# Patient Record
Sex: FEMALE | Race: WHITE | Employment: OTHER | URBAN - METROPOLITAN AREA
[De-identification: names, ages, dates, MRNs, and addresses within clinical notes are randomized per-mention and may not be internally consistent; named-entity substitution may affect disease eponyms.]

---

## 2017-04-28 ENCOUNTER — OFFICE VISIT (OUTPATIENT)
Dept: FAMILY MEDICINE CLINIC | Age: 52
End: 2017-04-28

## 2017-04-28 VITALS
WEIGHT: 185.1 LBS | BODY MASS INDEX: 34.06 KG/M2 | SYSTOLIC BLOOD PRESSURE: 118 MMHG | RESPIRATION RATE: 16 BRPM | DIASTOLIC BLOOD PRESSURE: 76 MMHG | HEIGHT: 62 IN | OXYGEN SATURATION: 99 % | HEART RATE: 68 BPM

## 2017-04-28 DIAGNOSIS — J40 BRONCHITIS: Primary | ICD-10-CM

## 2017-04-28 DIAGNOSIS — Z12.39 SCREENING FOR BREAST CANCER: ICD-10-CM

## 2017-04-28 DIAGNOSIS — Z13.9 SCREENING: ICD-10-CM

## 2017-04-28 PROCEDURE — 99213 OFFICE O/P EST LOW 20 MIN: CPT | Performed by: NURSE PRACTITIONER

## 2017-04-28 ASSESSMENT — ENCOUNTER SYMPTOMS
SHORTNESS OF BREATH: 0
NAUSEA: 0
COUGH: 0
ABDOMINAL PAIN: 0

## 2018-05-02 ENCOUNTER — TELEPHONE (OUTPATIENT)
Dept: FAMILY MEDICINE CLINIC | Age: 53
End: 2018-05-02

## 2019-03-25 ENCOUNTER — HOSPITAL ENCOUNTER (EMERGENCY)
Age: 54
Discharge: HOME | End: 2019-03-25

## 2019-03-25 VITALS
OXYGEN SATURATION: 99 % | DIASTOLIC BLOOD PRESSURE: 92 MMHG | TEMPERATURE: 98.4 F | HEART RATE: 75 BPM | SYSTOLIC BLOOD PRESSURE: 145 MMHG

## 2019-03-25 DIAGNOSIS — H65.93 MIDDLE EAR EFFUSION, BILATERAL: ICD-10-CM

## 2019-03-25 DIAGNOSIS — J02.9 ACUTE PHARYNGITIS, UNSPECIFIED ETIOLOGY: Primary | ICD-10-CM

## 2019-03-25 LAB
FLU A ANTIGEN: NEGATIVE
FLU B ANTIGEN: NEGATIVE
GROUP A STREP CULTURE, REFLEX: NEGATIVE
REFLEX THROAT C + S: NORMAL

## 2019-03-25 PROCEDURE — 87804 INFLUENZA ASSAY W/OPTIC: CPT

## 2019-03-25 PROCEDURE — 99282 EMERGENCY DEPT VISIT SF MDM: CPT

## 2019-03-25 PROCEDURE — 6360000002 HC RX W HCPCS: Performed by: NURSE PRACTITIONER

## 2019-03-25 PROCEDURE — 96372 THER/PROPH/DIAG INJ SC/IM: CPT

## 2019-03-25 PROCEDURE — 87070 CULTURE OTHR SPECIMN AEROBIC: CPT

## 2019-03-25 PROCEDURE — 87880 STREP A ASSAY W/OPTIC: CPT

## 2019-03-25 RX ORDER — CETIRIZINE HYDROCHLORIDE 10 MG/1
10 TABLET ORAL DAILY
Qty: 30 TABLET | Refills: 0 | Status: SHIPPED | OUTPATIENT
Start: 2019-03-25 | End: 2019-04-24

## 2019-03-25 RX ADMIN — PENICILLIN G BENZATHINE 1.2 MILLION UNITS: 1200000 INJECTION, SUSPENSION INTRAMUSCULAR at 22:34

## 2019-03-25 ASSESSMENT — PAIN SCALES - GENERAL: PAINLEVEL_OUTOF10: 8

## 2019-03-25 ASSESSMENT — ENCOUNTER SYMPTOMS
COUGH: 0
WHEEZING: 0
DIARRHEA: 0
RHINORRHEA: 0
BACK PAIN: 0
SORE THROAT: 1
ABDOMINAL PAIN: 0
NAUSEA: 0
VOMITING: 0
EYE PAIN: 0
SHORTNESS OF BREATH: 0
EYE DISCHARGE: 0

## 2019-03-25 ASSESSMENT — PAIN DESCRIPTION - DESCRIPTORS: DESCRIPTORS: BURNING;SHARP

## 2019-03-25 ASSESSMENT — PAIN DESCRIPTION - PAIN TYPE: TYPE: ACUTE PAIN

## 2019-03-25 ASSESSMENT — PAIN DESCRIPTION - LOCATION: LOCATION: THROAT

## 2019-03-26 ENCOUNTER — TELEPHONE (OUTPATIENT)
Dept: FAMILY MEDICINE CLINIC | Age: 54
End: 2019-03-26

## 2019-03-27 LAB — THROAT/NOSE CULTURE: NORMAL

## 2019-03-28 ENCOUNTER — TELEPHONE (OUTPATIENT)
Dept: FAMILY MEDICINE CLINIC | Age: 54
End: 2019-03-28

## 2019-03-28 NOTE — TELEPHONE ENCOUNTER
Patient is having [employer] fax records of colonoscopy and endoscopy from 2015 faxed and would like a call back once we get them.

## 2019-04-10 ENCOUNTER — TELEPHONE (OUTPATIENT)
Dept: FAMILY MEDICINE CLINIC | Age: 54
End: 2019-04-10

## 2020-07-06 ENCOUNTER — TELEPHONE (OUTPATIENT)
Dept: FAMILY MEDICINE CLINIC | Age: 55
End: 2020-07-06

## 2020-07-07 ENCOUNTER — OFFICE VISIT (OUTPATIENT)
Dept: FAMILY MEDICINE CLINIC | Age: 55
End: 2020-07-07
Payer: MEDICAID

## 2020-07-07 ENCOUNTER — NURSE ONLY (OUTPATIENT)
Dept: LAB | Age: 55
End: 2020-07-07

## 2020-07-07 VITALS
RESPIRATION RATE: 20 BRPM | HEIGHT: 62 IN | HEART RATE: 80 BPM | SYSTOLIC BLOOD PRESSURE: 128 MMHG | WEIGHT: 198.9 LBS | TEMPERATURE: 98.4 F | DIASTOLIC BLOOD PRESSURE: 84 MMHG | BODY MASS INDEX: 36.6 KG/M2

## 2020-07-07 LAB
ALBUMIN SERPL-MCNC: 4.4 G/DL (ref 3.5–5.1)
ALP BLD-CCNC: 111 U/L (ref 38–126)
ALT SERPL-CCNC: 20 U/L (ref 11–66)
ANION GAP SERPL CALCULATED.3IONS-SCNC: 10 MEQ/L (ref 8–16)
AST SERPL-CCNC: 18 U/L (ref 5–40)
AVERAGE GLUCOSE: 102 MG/DL (ref 70–126)
BASOPHILS # BLD: 0.9 %
BASOPHILS ABSOLUTE: 0.1 THOU/MM3 (ref 0–0.1)
BILIRUB SERPL-MCNC: 0.2 MG/DL (ref 0.3–1.2)
BUN BLDV-MCNC: 14 MG/DL (ref 7–22)
CALCIUM SERPL-MCNC: 9.1 MG/DL (ref 8.5–10.5)
CHLORIDE BLD-SCNC: 108 MEQ/L (ref 98–111)
CHOLESTEROL, TOTAL: 215 MG/DL (ref 100–199)
CO2: 24 MEQ/L (ref 23–33)
CREAT SERPL-MCNC: 0.7 MG/DL (ref 0.4–1.2)
EOSINOPHIL # BLD: 3.5 %
EOSINOPHILS ABSOLUTE: 0.2 THOU/MM3 (ref 0–0.4)
ERYTHROCYTE [DISTWIDTH] IN BLOOD BY AUTOMATED COUNT: 12.9 % (ref 11.5–14.5)
ERYTHROCYTE [DISTWIDTH] IN BLOOD BY AUTOMATED COUNT: 47.8 FL (ref 35–45)
GFR SERPL CREATININE-BSD FRML MDRD: 87 ML/MIN/1.73M2
GLUCOSE BLD-MCNC: 103 MG/DL (ref 70–108)
HBA1C MFR BLD: 5.4 % (ref 4.4–6.4)
HCT VFR BLD CALC: 50.3 % (ref 37–47)
HDLC SERPL-MCNC: 75 MG/DL
HEMOGLOBIN: 16 GM/DL (ref 12–16)
IMMATURE GRANS (ABS): 0.02 THOU/MM3 (ref 0–0.07)
IMMATURE GRANULOCYTES: 0.3 %
LDL CHOLESTEROL CALCULATED: 115 MG/DL
LYMPHOCYTES # BLD: 29 %
LYMPHOCYTES ABSOLUTE: 1.9 THOU/MM3 (ref 1–4.8)
MAGNESIUM: 2.3 MG/DL (ref 1.6–2.4)
MCH RBC QN AUTO: 32.3 PG (ref 26–33)
MCHC RBC AUTO-ENTMCNC: 31.8 GM/DL (ref 32.2–35.5)
MCV RBC AUTO: 101.4 FL (ref 81–99)
MONOCYTES # BLD: 8.7 %
MONOCYTES ABSOLUTE: 0.6 THOU/MM3 (ref 0.4–1.3)
NUCLEATED RED BLOOD CELLS: 0 /100 WBC
PLATELET # BLD: 260 THOU/MM3 (ref 130–400)
PMV BLD AUTO: 9.3 FL (ref 9.4–12.4)
POTASSIUM SERPL-SCNC: 4.7 MEQ/L (ref 3.5–5.2)
RBC # BLD: 4.96 MILL/MM3 (ref 4.2–5.4)
SEG NEUTROPHILS: 57.6 %
SEGMENTED NEUTROPHILS ABSOLUTE COUNT: 3.8 THOU/MM3 (ref 1.8–7.7)
SODIUM BLD-SCNC: 142 MEQ/L (ref 135–145)
TOTAL PROTEIN: 7 G/DL (ref 6.1–8)
TRIGL SERPL-MCNC: 124 MG/DL (ref 0–199)
TSH SERPL DL<=0.05 MIU/L-ACNC: 1.3 UIU/ML (ref 0.4–4.2)
WBC # BLD: 6.6 THOU/MM3 (ref 4.8–10.8)

## 2020-07-07 PROCEDURE — 99386 PREV VISIT NEW AGE 40-64: CPT | Performed by: FAMILY MEDICINE

## 2020-07-07 RX ORDER — METHYLPREDNISOLONE 4 MG/1
TABLET ORAL
Qty: 1 KIT | Refills: 0 | Status: SHIPPED | OUTPATIENT
Start: 2020-07-07 | End: 2020-07-13

## 2020-07-07 SDOH — ECONOMIC STABILITY: FOOD INSECURITY: WITHIN THE PAST 12 MONTHS, THE FOOD YOU BOUGHT JUST DIDN'T LAST AND YOU DIDN'T HAVE MONEY TO GET MORE.: NEVER TRUE

## 2020-07-07 SDOH — ECONOMIC STABILITY: TRANSPORTATION INSECURITY
IN THE PAST 12 MONTHS, HAS THE LACK OF TRANSPORTATION KEPT YOU FROM MEDICAL APPOINTMENTS OR FROM GETTING MEDICATIONS?: NO

## 2020-07-07 SDOH — ECONOMIC STABILITY: INCOME INSECURITY: HOW HARD IS IT FOR YOU TO PAY FOR THE VERY BASICS LIKE FOOD, HOUSING, MEDICAL CARE, AND HEATING?: NOT HARD AT ALL

## 2020-07-07 SDOH — ECONOMIC STABILITY: FOOD INSECURITY: WITHIN THE PAST 12 MONTHS, YOU WORRIED THAT YOUR FOOD WOULD RUN OUT BEFORE YOU GOT MONEY TO BUY MORE.: NEVER TRUE

## 2020-07-07 SDOH — ECONOMIC STABILITY: TRANSPORTATION INSECURITY
IN THE PAST 12 MONTHS, HAS LACK OF TRANSPORTATION KEPT YOU FROM MEETINGS, WORK, OR FROM GETTING THINGS NEEDED FOR DAILY LIVING?: NO

## 2020-07-07 ASSESSMENT — PATIENT HEALTH QUESTIONNAIRE - PHQ9
SUM OF ALL RESPONSES TO PHQ QUESTIONS 1-9: 0
1. LITTLE INTEREST OR PLEASURE IN DOING THINGS: 0
SUM OF ALL RESPONSES TO PHQ9 QUESTIONS 1 & 2: 0
2. FEELING DOWN, DEPRESSED OR HOPELESS: 0
SUM OF ALL RESPONSES TO PHQ QUESTIONS 1-9: 0

## 2020-07-07 ASSESSMENT — ENCOUNTER SYMPTOMS
GASTROINTESTINAL NEGATIVE: 1
RESPIRATORY NEGATIVE: 1

## 2020-07-07 NOTE — PROGRESS NOTES
Subjective:      Patient ID: Corrina Kennedy is a 48 y.o. female. HPI:    Chief Complaint   Patient presents with   [address] from [facility] ED 7/6/2020 right leg edema and pain    Established New Doctor     last seen in our office 4/28/17, no PCP since that time     NP to re-establish. Last seen in 2017. Pt c/o right leg pain and swelling for the last 2 days. NKI. Denies redness but there was a slight bruise. Was seen at [facility] ED, [address] negative. Compared to yesterday, pain is worse. Has noticed some increased swelling in her right and ankle region. Denies knee pain. Taking ASA with no relief. BP well controlled. BP Readings from Last 3 Encounters:   07/07/20 128/84   03/25/19 (!) 145/92   04/28/17 118/76     Weight is up, pt has not been as active. Wt Readings from Last 3 Encounters:   07/07/20 198 lb 14.4 oz (90.2 kg)   04/28/17 185 lb 1.6 oz (84 kg)   04/27/17 184 lb (83.5 kg)     Colonoscopy UTD, due again in 2025. Well female not UTD. Patient Active Problem List   Diagnosis    Obstructive sleep apnea    Snoring    Sleep disturbance    Daytime somnolence    Hypertension     Past Surgical History:   Procedure Laterality Date    ADENOIDECTOMY      APPENDECTOMY      BLADDER SUSPENSION      FOOT SURGERY      KNEE ARTHROSCOPY      MYRINGOTOMY AND TYMPANOSTOMY TUBE PLACEMENT      TONSILLECTOMY      TUBAL LIGATION       Prior to Admission medications    Medication Sig Start Date End Date Taking?  Authorizing Provider   ASHWAGANDHA PO Take by mouth   Yes Historical Provider, MD   Pyridoxine HCl (VITAMIN B6 PO) Take by mouth   Yes Historical Provider, MD   Cyanocobalamin (VITAMIN B12 PO) Take by mouth   Yes Historical Provider, MD   Omega-3 Fatty Acids (OMEGA-3 PO) Take by mouth   Yes Historical Provider, MD   Ascorbic Acid (VITAMIN C PO) Take by mouth   Yes Historical Provider, MD   BIOTIN PO Take by mouth   Yes Historical Provider, MD   MAGNESIUM-POTASSIUM PO Take by mouth   Yes Historical Provider, MD   Probiotic Product (PROBIOTIC DAILY PO) Take by mouth   Yes Historical Provider, MD   albuterol (PROVENTIL) (2.5 MG/3ML) 0.083% nebulizer solution Take 3 mLs by nebulization every 4 hours as needed for Wheezing 4/27/17  Yes PRITESH Yu CNP   Multiple Vitamins-Minerals (THERAPEUTIC MULTIVITAMIN-MINERALS) tablet Take 1 tablet by mouth daily. Yes Historical Provider, MD   Ferrous Sulfate (IRON PO) Take by mouth    Historical Provider, MD   pseudoephedrine-guaiFENesin (MUCINEX D)  MG per extended release tablet Take 1 tablet by mouth every 12 hours    Historical Provider, MD   albuterol sulfate  (90 BASE) MCG/ACT inhaler Inhale 2 puffs into the lungs every 4 hours as needed for Wheezing or Shortness of Breath 4/27/17   PRITESH Yu CNP   fluticasone (FLONASE) 50 MCG/ACT nasal spray 2 sprays by Nasal route daily Apply daily to each nare 4/27/17   PRITESH Yu CNP   Spacer/Aero-Holding Marta Hira 1 Device by Does not apply route daily as needed (for albuterol inhaler) 4/27/17   PRITESH Yu CNP   Respiratory Therapy Supplies (NEBULIZER COMPRESSOR) KIT 1 kit by Does not apply route once for 1 dose Diagnosis: Allergic Bronchitis 4/27/17 4/27/17  PRITESH Yu CNP         Review of Systems   Constitutional: Negative. HENT: Negative. Respiratory: Negative. Cardiovascular: Negative. Gastrointestinal: Negative. Musculoskeletal: Positive for myalgias. Joint swelling: right thigh pain. All other systems reviewed and are negative. Objective:   Physical Exam  Vitals signs and nursing note reviewed. Constitutional:       Appearance: She is well-developed. HENT:      Head: Normocephalic and atraumatic. Right Ear: Tympanic membrane normal.      Left Ear: Tympanic membrane normal.   Cardiovascular:      Rate and Rhythm: Normal rate and regular rhythm. Heart sounds: Normal heart sounds. No murmur. Pulmonary:      Effort: Pulmonary effort is normal.      Breath sounds: Normal breath sounds. Abdominal:      General: Bowel sounds are normal.      Palpations: Abdomen is soft. Musculoskeletal:      Right upper leg: She exhibits tenderness. She exhibits no swelling. Right lower leg: Edema present. Left lower leg: Edema (trace non-pitting edema bl LEs) present. Legs:    Skin:     General: Skin is warm and dry. Neurological:      Mental Status: She is alert and oriented to person, place, and time. Psychiatric:         Behavior: Behavior normal.         Assessment:       Diagnosis Orders   1. Well adult health check  Lipid Panel    Comprehensive Metabolic Panel    Hemoglobin A1C    TSH with Reflex    CBC Auto Differential    Magnesium   2. Acute pain of right thigh  methylPREDNISolone (MEDROL, PAULETTE,) 4 MG tablet   3. Leg swelling     4.  Tobacco abuse             Plan:      -  Healthy lifestyle discussed  -  Colonoscopy UTD  -  Due for pap and mammogram, will schedule with Gyn  -  Check maintenance labs, will call  -  Uncertain etiology of #2, strain likely  -  US neg for DVT  -  RICE  -  rx MDP  -  LE elevation, quit smoking  -  RTO prn        Freda Res, DO

## 2020-07-07 NOTE — PROGRESS NOTES
Visit Information    Have you changed or started any medications since your last visit including any over-the-counter medicines, vitamins, or herbal medicines? Yes, see list   Are you having any side effects from any of your medications? -  no  Have you stopped taking any of your medications? Is so, why? -  yes - tx complete    Have you seen any other physician or provider since your last visit? No  Have you had any other diagnostic tests since your last visit? Yes - Records Obtained  Have you been seen in the emergency room and/or had an admission to a hospital since we last saw you? Yes - Records Obtained  Have you had your routine dental cleaning in the past 6 months? no    Have you activated your [employer] account? If not, what are your barriers?  Yes     Patient Care Team:  Naren Uriarte DO as PCP - General (Family Medicine)  Naren Uriarte DO as PCP - [facility] Provider    Medical History Review  Past Medical, Family, and Social History reviewed and does contribute to the patient presenting condition    Health Maintenance   Topic Date Due    HIV screen  07/06/1981    DTaP/Tdap/Td vaccine (1 - Tdap) 07/06/1985    Cervical cancer screen  07/06/1987    Lipid screen  07/06/2006    Breast cancer screen  07/06/2016    Shingles Vaccine (1 of 2) 07/06/2016    Flu vaccine (1) 09/01/2020    Colon cancer screen colonoscopy  03/28/2029    Hepatitis A vaccine  Aged Out    Hepatitis B vaccine  Aged Out    Hib vaccine  Aged Out    Meningococcal (ACWY) vaccine  Aged Out    Pneumococcal 0-64 years Vaccine  Aged Out

## 2020-07-08 ENCOUNTER — TELEPHONE (OUTPATIENT)
Dept: FAMILY MEDICINE CLINIC | Age: 55
End: 2020-07-08

## 2020-07-08 NOTE — TELEPHONE ENCOUNTER
Pt called office left  stating at her last visit it was discussed that her hormones would be checked with her lab draw. She could not find them on [employer]. Pt is asking for the hormone results. Please advise.

## 2020-07-08 NOTE — TELEPHONE ENCOUNTER
Unfortunately, we do not deal with Dentist directly on a regular basis so I have no recommendation at this time.

## 2020-07-08 NOTE — TELEPHONE ENCOUNTER
The only hormone that is routinely checked is the TSH, this was discussed at her appt. Her TSH is normal.  If pt wishes to have all of her hormones checked (FSH, LH, prolactin, testosterone, estrogen, DHEAS, cortisol, etc.), recommend consult with Dr. Kee Urena.

## 2020-07-08 NOTE — TELEPHONE ENCOUNTER
Patient is calling stating she got to thinking about the question of has she seen a dentist and she is wondering who Dr. Florecita Becerra would recommend in [facility] or [address] [facility], please advise at [phone]

## 2020-08-27 ENCOUNTER — NURSE ONLY (OUTPATIENT)
Dept: LAB | Age: 55
End: 2020-08-27

## 2020-08-27 ENCOUNTER — OFFICE VISIT (OUTPATIENT)
Dept: FAMILY MEDICINE CLINIC | Age: 55
End: 2020-08-27
Payer: MEDICAID

## 2020-08-27 VITALS
TEMPERATURE: 97 F | BODY MASS INDEX: 37.76 KG/M2 | HEIGHT: 62 IN | OXYGEN SATURATION: 98 % | RESPIRATION RATE: 12 BRPM | SYSTOLIC BLOOD PRESSURE: 122 MMHG | DIASTOLIC BLOOD PRESSURE: 74 MMHG | HEART RATE: 99 BPM | WEIGHT: 205.2 LBS

## 2020-08-27 LAB
AMYLASE: 87 U/L (ref 20–104)
BASOPHILS # BLD: 0.8 %
BASOPHILS ABSOLUTE: 0.1 THOU/MM3 (ref 0–0.1)
CALCIUM SERPL-MCNC: 9.5 MG/DL (ref 8.5–10.5)
EOSINOPHIL # BLD: 2.4 %
EOSINOPHILS ABSOLUTE: 0.2 THOU/MM3 (ref 0–0.4)
ERYTHROCYTE [DISTWIDTH] IN BLOOD BY AUTOMATED COUNT: 12.6 % (ref 11.5–14.5)
ERYTHROCYTE [DISTWIDTH] IN BLOOD BY AUTOMATED COUNT: 45.3 FL (ref 35–45)
ESTRADIOL LEVEL: 7.3 PG/ML
FOLLICLE STIMULATING HORMONE: 78.6 MIU/ML (ref 16–160)
HCT VFR BLD CALC: 46.1 % (ref 37–47)
HEMOGLOBIN: 15.6 GM/DL (ref 12–16)
IMMATURE GRANS (ABS): 0.03 THOU/MM3 (ref 0–0.07)
IMMATURE GRANULOCYTES: 0.4 %
LIPASE: 156.6 U/L (ref 5.6–51.3)
LUTEINIZING HORMONE: 57.3 MIU/ML (ref 3.3–70.6)
LYMPHOCYTES # BLD: 24.7 %
LYMPHOCYTES ABSOLUTE: 2.1 THOU/MM3 (ref 1–4.8)
MAGNESIUM: 2.2 MG/DL (ref 1.6–2.4)
MCH RBC QN AUTO: 33.3 PG (ref 26–33)
MCHC RBC AUTO-ENTMCNC: 33.8 GM/DL (ref 32.2–35.5)
MCV RBC AUTO: 98.5 FL (ref 81–99)
MONOCYTES # BLD: 8.5 %
MONOCYTES ABSOLUTE: 0.7 THOU/MM3 (ref 0.4–1.3)
NUCLEATED RED BLOOD CELLS: 0 /100 WBC
PLATELET # BLD: 274 THOU/MM3 (ref 130–400)
PMV BLD AUTO: 9.8 FL (ref 9.4–12.4)
PROGESTERONE LEVEL: < 0.05 NG/ML
PTH INTACT: 36.7 PG/ML (ref 15–65)
RBC # BLD: 4.68 MILL/MM3 (ref 4.2–5.4)
RHEUMATOID FACTOR: < 10 IU/ML (ref 0–13)
SEDIMENTATION RATE, ERYTHROCYTE: 4 MM/HR (ref 0–20)
SEG NEUTROPHILS: 63.2 %
SEGMENTED NEUTROPHILS ABSOLUTE COUNT: 5.3 THOU/MM3 (ref 1.8–7.7)
T3 TOTAL: 110 NG/DL (ref 72–181)
T4 FREE: 1.12 NG/DL (ref 0.93–1.76)
URIC ACID: 5.3 MG/DL (ref 2.4–5.7)
VITAMIN D 25-HYDROXY: 34 NG/ML (ref 30–100)
WBC # BLD: 8.4 THOU/MM3 (ref 4.8–10.8)

## 2020-08-27 PROCEDURE — G8417 CALC BMI ABV UP PARAM F/U: HCPCS | Performed by: FAMILY MEDICINE

## 2020-08-27 PROCEDURE — G8427 DOCREV CUR MEDS BY ELIG CLIN: HCPCS | Performed by: FAMILY MEDICINE

## 2020-08-27 PROCEDURE — 4004F PT TOBACCO SCREEN RCVD TLK: CPT | Performed by: FAMILY MEDICINE

## 2020-08-27 PROCEDURE — 99214 OFFICE O/P EST MOD 30 MIN: CPT | Performed by: FAMILY MEDICINE

## 2020-08-27 PROCEDURE — 3017F COLORECTAL CA SCREEN DOC REV: CPT | Performed by: FAMILY MEDICINE

## 2020-08-27 RX ORDER — LANOLIN ALCOHOL/MO/W.PET/CERES
325 CREAM (GRAM) TOPICAL DAILY PRN
COMMUNITY
End: 2021-02-23 | Stop reason: ALTCHOICE

## 2020-08-27 ASSESSMENT — ENCOUNTER SYMPTOMS
ABDOMINAL DISTENTION: 1
DIARRHEA: 1
RESPIRATORY NEGATIVE: 1
EYES NEGATIVE: 1

## 2020-08-27 NOTE — PROGRESS NOTES
[id number] [address]  Dept: [phone]  Dept Fax: [phone]  Loc: Tremaine Wilson is a 48 y.o. White female. Juan Emery  presents to the [facility] today for   Chief Complaint   Patient presents with   Parul johns    Diarrhea     soft to loose    Weight Loss    Fatigue    Skin Problem     itch    Bloated     ? wheat    Neck Pain     chiro    Insomnia    Daytime Sleepiness    Congestion     sinus    Hand Numbness     if drive to [place]   ,  and;   1. Daytime somnolence    2. Essential hypertension    3. Obstructive sleep apnea    4. Sleep disturbance    5. Snoring    6. Other intestinal malabsorption    7. Acute pain of right thigh    8. Leg swelling      I have reviewed Precious Abarca medical, surgical and other pertinent history in detail, and have updated medication and allergy information in the computerized patientrecord. Clinical Care Team:     -Referring Provider for today's consult: Self Referred  -Primary Care Provider: Victoriano Conner DO    Medical/Surgical History:   She  has a past medical history of Hypertension. Her  has a past surgical history that includes Tubal ligation; bladder suspension; Appendectomy; Foot surgery; Tonsillectomy; Adenoidectomy; Myringotomy Tympanostomy Tube Placement; and Knee arthroscopy. Family/Social History:     Her family history includes Cancer in her father; No Known Problems in her brother, mother, and sister. She  reports that she has been smoking cigarettes. She started smoking about 40 years ago. She has been smoking about 1.00 pack per day. She has never used smokeless tobacco. She reports current alcohol use of about 12.0 standard drinks of alcohol per week. She reports that she does not use drugs.     Medications/Allergies/Immunizations:     Her current medication(s) include   Current Outpatient Medications:     NONFORMULARY, 2 drops daily Sovereign silver bio-active drops, Disp: , Rfl:     NONFORMULARY, Gutamin 1 tablet daily, Disp: , Rfl:     QUERCETIN PO, Take by mouth daily, Disp: , Rfl:     ferrous sulfate (FE TABS 325) 325 (65 Fe) MG EC tablet, Take 325 mg by mouth daily as needed, Disp: , Rfl:     Medium Chain Triglycerides (MCT OIL PO), Take by mouth daily, Disp: , Rfl:     Psyllium (DAILY FIBER PO), Take by mouth as needed, Disp: , Rfl:     5-Hydroxytryptophan (5-HTP PO), Take by mouth nightly as needed, Disp: , Rfl:     NONFORMULARY, Keto Elevate powder- daily, Disp: , Rfl:     NONFORMULARY, CBD OIL - PRN, Disp: , Rfl:     Pyridoxine HCl (VITAMIN B6 PO), Take by mouth, Disp: , Rfl:     Cyanocobalamin (VITAMIN B12 PO), Take by mouth, Disp: , Rfl:     Omega-3 Fatty Acids (OMEGA-3 PO), Take by mouth, Disp: , Rfl:     Ascorbic Acid (VITAMIN C PO), Take by mouth, Disp: , Rfl:     BIOTIN PO, Take by mouth, Disp: , Rfl:     MAGNESIUM-POTASSIUM PO, Take by mouth, Disp: , Rfl:     Probiotic Product (PROBIOTIC DAILY PO), Take by mouth, Disp: , Rfl:     ASHWAGANDHA PO, Take by mouth, Disp: , Rfl:     Multiple Vitamins-Minerals (THERAPEUTIC MULTIVITAMIN-MINERALS) tablet, Take 1 tablet by mouth daily. , Disp: , Rfl:   Allergies: Percocet [oxycodone-acetaminophen],  Immunizations:   Immunization History   Administered Date(s) Administered    DTaP vaccine 08/19/1970, 02/01/2010, 04/01/2010    Hepatitis B 07/27/2010    Hepatitis B Ped/Adol (Engerix-B, Recombivax HB) 02/01/2010, 04/01/2010    MMR 08/19/1970, 04/01/2010    Tdap (Boostrix, Adacel) 10/14/2010    Varicella (Varivax) 10/14/2010        History of PresentIllness:     Precious's had concerns including Established New Doctor (wee protocol); Diarrhea (soft to loose); Weight Loss; Fatigue; Skin Problem (itch); Bloated (? wheat); Neck Pain (chiro);  Insomnia; Daytime Sleepiness; Congestion (sinus); and Hand Numbness (if drive to [facility]). Mona Adams  presents to the [address] [place] [address] today for;   Chief Complaint   Patient presents with   Michael blanton protocol    Diarrhea     soft to loose    Weight Loss    Fatigue    Skin Problem     itch    Bloated     ? wheat    Neck Pain     chiro    Insomnia    Daytime Sleepiness    Congestion     sinus    Hand Numbness     if drive to [facility]   , ,  abnormal labs follow up and these conditions as she  Is looking today for:     1. Daytime somnolence    2. Essential hypertension    3. Obstructive sleep apnea    4. Sleep disturbance    5. Snoring    6. Other intestinal malabsorption    7. Acute pain of right thigh    8. Leg swelling      HPI    Subjective:     Review of Systems   Constitutional: Positive for fatigue and unexpected weight change. HENT: Negative. Eyes: Negative. Respiratory: Negative. Cardiovascular: Positive for leg swelling. Gastrointestinal: Positive for abdominal distention and diarrhea. Endocrine: Positive for heat intolerance. Genitourinary: Positive for frequency. Musculoskeletal: Positive for neck pain. Skin:        Epithelioma on scalp       Allergic/Immunologic: Positive for environmental allergies. Neurological: Positive for speech difficulty, light-headedness and numbness. Hematological: Negative. Psychiatric/Behavioral: Positive for decreased concentration and sleep disturbance. All other systems reviewed and are negative. Objective:     /74 (Site: Right Upper Arm, Position: Sitting, Cuff Size: Large Adult)   Pulse 99   Temp 97 °F (36.1 °C) (Temporal)   Resp 12   Ht 5' 2\" (1.575 m)   Wt 205 lb 3.2 oz (93.1 kg)   SpO2 98%   BMI 37.53 kg/m²   Physical Exam  Vitals signs and nursing note reviewed. Constitutional:       Appearance: Normal appearance. She is obese. HENT:      Head: Normocephalic.    Pulmonary:      Effort: Pulmonary effort is normal.   Neurological:      Mental Status: She is alert. Psychiatric:         Mood and Affect: Mood normal.         Thought Content: Thought content normal.            Laboratory Data:   Lab results were searched in Care Everywhere and/or those brought by the pateint were reviewed today with Bebe Bauer and she has a copy of their most recent labs to take home with them as notedbelow;       Imaging Data:   Imaging Data:       Assessment & Plan:       Impression:  1. Daytime somnolence    2. Essential hypertension    3. Obstructive sleep apnea    4. Sleep disturbance    5. Snoring    6. Other intestinal malabsorption    7. Acute pain of right thigh    8. Leg swelling      Assessment and Plan:  After reviewing the patients chief complaints, reviewing their labfindings in great detail (with the patient and those accompanying them) which correlate to their chief complaints, symptoms, and or medical conditions; suggestions were made relating to changes in diet and or supplementswhich may improve the complaints and which will be reflected in their future lab findings; Chief Complaint   Patient presents with   [facility] Celestino Doctor     wee protocol    Diarrhea     soft to loose    Weight Loss    Fatigue    Skin Problem     itch    Bloated     ? wheat    Neck Pain     chiro    Insomnia    Daytime Sleepiness    Congestion     sinus    Hand Numbness     if drive to [address]   ;    Plans for the next visits:  - Abnormal and non-optimal Labs were ordered today to be repeated in the next 120-365 days to assess changes from adjustments in nutrition and or nutrients.    - Patient instructed when having ablood draw to ask the  to divide their lab draws into multiple draws over several days if not feeling good at the time of the lab draw or if either prefers to do several smaller blood draws over several days  -Patient instructed to check with insurer before each lab draw and to to to the lab which the insurer directs them for the most cost effective lab draw with the least patient's cost  - Raul Aceves  will be scheduled subsequentto those results. Nic Robertson will bring in her drink and food log to her next visit    Chronic Problems Addressed on this Visit:                                   1.  Intensity of Service; Uncontrolled items at this visit; Chief Complaint   Patient presents with   Michael blanton protocol    Diarrhea     soft to loose    Weight Loss    Fatigue    Skin Problem     itch    Bloated     ? wheat    Neck Pain     chiro    Insomnia    Daytime Sleepiness    Congestion     sinus    Hand Numbness     if drive to [facility]   ; Improved items at this visit; Stable items atthis visit;  2. Patients food and drinks were reviewed with the patient,       - Raul Aceves will bring food+drink symptom log to next visit for inclusion in their record      - 75 better food list reviewed & given topatient with the omega 6 food list to avoid         - Gluten in corn and oats abstracts sheet reviewed and given to the patient today   3. Greater than 45 minutes were spent face to face on this visit of which >50% was for counseling and coordination of care. Patients food and drinks were reviewed with thepatient,   - they will bring a food drink symptom log to future visits for inclusion in their record    - 75 better food list reviewed & given to patient along with the omega 6 food list to avoid      - Glutenin corn and oats abstracts sheet reviewed and given to the patient today    - 23 Foods containing Latex-like proteins was reviewed and copy to be taken if desired     - Nutrient Supplements list provided and copyto be taken if desired    - [id number]. A[employer] web site offered to patient to review at their convenience by staff with login information    Note:  I have discussed with the patient that with all nutraceuticals, there is often mixed data and emerging research which needs to be monitored; as well as an array of NIHfact sheets on nutrients and supplements. If I have recommended cinnamon at the request of this patient to assist them in control of their blood sugar, triglyceride and or weight issues. I discussed that thepatient's clinical use of cinnamon bark, calcium, magnesium, Vitamin D and pharmaceutical grade CVS #635991 fish oil or triple-strength fish oil, and B-75 two phase time-released B complex by Deepali Valentin will be for atime-limited trial to determine their individual effectiveness and safety in this patient. I also referred the patient to the NMCD: Nutrition, Metabolism, and Cardiovascular Diseases (journal) and concerns about long-termuse and hepatotoxicity of cinnamon and other nutrients and suggest they frequently search nih.gov for the latest non-proprietary information on nutriceuticals as well as consider a subscription to [employer] fordetails on reviewed supplements, or at the least review the nutrient files at [address] at [facility], [employer], an insulin mimetic, reduces some High Carbohydrate Dietary Impacts. Methylhydroxychalcone polymers insulin-enhancing properties in fat cells are responsible for enhanced glucose uptake, inhibiting hepatic HMG-CoA reductase and lowers lipids. www.jacn. org/content/20/4/327.full     But cinnamon with additivessuch as Cinnamon Extract are not effective as insulin mimetics.  :eStoreDirectory.at     Nutrients for Start up from [employer] or [employer] for ease to get started now ;  Sophy Cortez has some useable products;  - Triple Strength Fish Oil, enteric coated  - Vit D 3 5000 IU gel caps  - Iron ferrous sulfat 325 mg tabs  - Centrum Silver look-a-like for most patients, or  - Centrum plain look-a-like if need iron    Localpharmacies or chains such as CVS, Wal[facility], Wal-mart, have;  - Triple Strength Fish Oil (enteric for low iron on labs    Usually turns bowel movements grey, green or black but not a concern  - Time released Niacin 250 mg Item #[id number] for cold intolerance, low libido or impotence  - DHEA 50 mg Item #[id number] for improving DHEA levels on labs if having Fatigue    If stools too loose substitute for your Magnesium oxide using;   Magnesium citrate 200 mg tabs(NOT liquid) at [employer]   Magnesium gluconate 550 mgby Shai at [employer] or amazon. com  Magnesium chloride foot soaks or body sprays  www.[employer]   Magnesium chloride flakes 14.99 Item #: [id number] if Backordered get spray    Food Drink Symptom Log;  I asked this patient to track these items and any other symptoms on their list on a weekly basis to documenttheir progress or lack of same. This can be done on the symptom tracking sheet I gave them at today's visit but looks like this:                                                      Rate on scale of 0-10 with zero = notnoticeable  Symptom:                            Week 1               2                 3                 4               Etc            Hair loss    Foot cramps    Paresthesia    Aches    IBS (irritable bowel)    Constipation    Diarrhea  Nocturia    (up to bathroom at night)    Fatigue/Energy level  Stress      On the other side of the sheet they can track their food, drink, environment, activity, symptoms etc      Avoiding Latex-like proteins inmy foods; Avocados, Bananas, Celery, Figs & Kiwi proteins have latex-like proteins to inflame our immunesystems  How Can I Have A Latex Allergy? Eating foods with latex-like protein exposes us to latex allergies. Our body cannot tell the differencebetween these latex-like proteins and latex from rubber products since many people are allergic to fruit, vegetables and latex. Read labels on pre-packaged foods.  This list to avoid is only a guide if you are known allergicto latex or have a latex rash on your chin, cheeks and lines on your neck and chest. The amount of latex is different in each food product or fruit variety. to Avoid out of Season if not grown locally: Melon, Nectarine, Papaya, Cherry, Passion fruit, Plum, Chestnuts, and Tomato. Avocado, Banana, Celery, Figs, and Kiwi always contain Latex-like protein. Whats in Season? Strawberries taste better in June than December because June is strawberry season so buy locally grown produce \"in season\" for the best flavor, cost and less Latex. Locally grown produce notonly tastes great requires little of no ethylene exposure in food distribution so has less latex content. Out of season, use canned, frozen or dried sinceprocessed ripe and are latex lower!!!   Month     [place] LocallyGrown Produce  January, February, March: use canned, frozen or dried fruits since lower in latex  April; asparagus, radishes  May; asparagus, broccoli, green onions, greens, peas, radishes,rhubarb  June; asparagus, beets, beans, broccoli, cabbage, cantaloupe, carrots, green onions, greens, lettuce,onions, parsley, peas, radishes, rhubarb, strawberries, watermelons  July; beans, beets, blueberries,broccoli, cabbage, cantaloupe, carrots, cauliflower, celery, cucumbers, eggplant, grapes, green onions, greens, lettuce, onions, parsley, peas, peaches, bell peppers, potatoes, radishes, summer raspberries, squash, sweetcorn, tomatoes, turnips, watermelons  August; apples, beans, beets, blueberries, cabbage, cantaloupe, carrots,cauliflower, celery, cucumbers, eggplant, grapes, green onions, greens, lettuce, onions, parsley, peas, peaches, pears, bell peppers, potatoes, radishes, squash, sweet corn, tomatoes, turnips, watermelons  September; apples, beans, beets, blueberries, cabbage, cantaloupe, carrots, cauliflower, celery, cucumbers, eggplant, grapes,green onions, greens, lettuce, onions, parsley, peas, peaches, pears, bell peppers, plums, potatoes, pumpkins, radishes, fall red raspberries, squash, sweet corn, tomatoes, turnips, watermelons  October; apples, beets, broccoli, cabbage, carrots, cauliflower, celery, green onions, greens, lettuce, parsley, peas, pears, potatoes,pumpkins, radishes, fall red raspberries, squash, turnips  November; broccoli, cabbage, carrots, parsley,pears, peas  December: use canned, frozen ordried fruits since lower in latex    Upto half of latex-sensitive patients show allergic reactions to fruits (avocados, bananas, kiwifruits, papayas, peaches),   Annals of Allergy, 1994. These plants contain the same proteins that are allergens in latex. People with fruit allergies should warn physicians beforeundergoing procedures which may cause anaphylactic reaction if in contact with latex gloves. Some of the common foods with defined cross-reactivity to latexare avocado, banana, kiwi, chestnut, raw potato, tomato,stone fruits (e.g., peach, cherry), hazelnut, melons, celery, carrot, apple, pear, papaya, and almond. Foods with less well-defined cross-reactivity to latex are peanuts, peppers, citrus fruits, coconut, pineapple, dontrell,fig, passion fruit, Ugli fruit, and grape    This fruit/latex cross-reactivity is worsened by ethylene, a gas used to hasten commercial ripening. In nature, plants produce low levels of the hormone ethylene, which regulates germination, flowering, and ripening. Forced ripening by high ethyleneconcentrations, plants produce allergenic wound-repair proteins, which are similar to wound-repair proteins made during the tapping of rubber trees. Sensitive individualswho ingest the fruit get a higher dose and worse reaction. Some people may even first become sensitized to latex through fruit. Can food processing increase theconcentrations of allergenic proteins? Latex-sensitized children (and adults) in [place] often experience allergic reactions after eating bananas ripenedartificially with ethylene.  In the [place], food distribution centers treat unripe bananas and other produce with ethylene to ripen; not commonly done in [facility] since fruit is tree-ripened there. Does treatmentof food with ethylene induce banana proteins that cross-react with latex? (Afua et al.    References:   Latex in Foods Allergy, http://ehp.niehs.nih.gov/members/2003/5811/5811.html    Search web for \" Whats in Season \" for whereyou live or are at the time you food shop  www.nutritioncouncil.org/pdf/healthy/SeasonalProduce. pdf ,   Management of Latex, ://medicalcenter. o[employer].edu/  search for latex

## 2020-08-27 NOTE — PATIENT INSTRUCTIONS
Patient Education        Learning About Benefits From Quitting Smoking  How does quitting smoking make you healthier? If you're thinking about quitting smoking, you may have a few reasons to be smoke-free. Your health may be one of them. · When you quit smoking, you lower your risks for cancer, lung disease, heart attack, stroke, blood vessel disease, and blindness from macular degeneration. · When you're smoke-free, you get sick less often, and you heal faster. You are less likely to get colds, flu, bronchitis, and pneumonia. · As a nonsmoker, you may find that your mood is better and you are less stressed. When and how will you feel healthier? Quitting has real health benefits that start from day 1 of being smoke-free. And the longer you stay smoke-free, the healthier you get and the better you feel. The first hours  · After just 20 minutes, your blood pressure and heart rate go down. That means there's less stress on your heart and blood vessels. · Within 12 hours, the level of carbon monoxide in your blood drops back to normal. That makes room for more oxygen. With more oxygen in your body, you may notice that you have more energy than when you smoked. After 2 weeks  · Your lungs start to work better. · Your risk of heart attack starts to drop. After 1 month  · When your lungs are clear, you cough less and breathe deeper, so it's easier to be active. · Your sense of taste and smell return. That means you can enjoy food more than you have since you started smoking. Over the years  · Over the years, your risks of heart disease, heart attack, and stroke are lower. · After 10 years, your risk of dying from lung cancer is cut by about half. And your risk for many other types of cancer is lower too. How would quitting help others in your life? When you quit smoking, you improve the health of everyone who now breathes in your smoke.   · Their heart, lung, and cancer risks drop, much like yours.  · They are sick less. For babies and small children, living smoke-free means they're less likely to have ear infections, pneumonia, and bronchitis. · If you're a woman who is or will be pregnant someday, quitting smoking means a healthier . · Children who are close to you are less likely to become adult smokers. Where can you learn more? Go to https://chpepiceweb.Materia. org and sign in to your [employer] account. Enter [phone] [id number] [phone] in the Fididel box to learn more about \"Learning About Benefits From Quitting Smoking. \"     If you do not have an account, please click on the \"Sign Up Now\" link. Current as of: 2020               Content Version: 12.5   Healthwise, Incorporated. Care instructions adapted under license by [facility] ([facility]). If you have questions about a medical condition or this instruction, always ask your healthcare professional. [place] [address] any warranty or liability for your use of this information. Patient Education        Stopping Smoking: Care Instructions  Your Care Instructions     Cigarette smokers crave the nicotine in cigarettes. Giving it up is much harder than simply changing a habit. Your body has to stop craving the nicotine. It is hard to quit, but you can do it. There are many tools that people use to quit smoking. You may find that combining tools works best for you. There are several steps to quitting. First you get ready to quit. Then you get support to help you. After that, you learn new skills and behaviors to become a nonsmoker. For many people, a necessary step is getting and using medicine. Your doctor will help you set up the plan that best meets your needs. You may want to attend a smoking cessation program to help you quit smoking. When you choose a program, look for one that has proven success. Ask your doctor for ideas.  You will greatly increase your chances of success if you take medicine as well as get counseling or join a cessation program.  Some of the changes you feel when you first quit tobacco are uncomfortable. Your body will miss the nicotine at first, and you may feel short-tempered and grumpy. You may have trouble sleeping or concentrating. Medicine can help you deal with these symptoms. You may struggle with changing your smoking habits and rituals. The last step is the tricky one: Be prepared for the smoking urge to continue for a time. This is a lot to deal with, but keep at it. You will feel better. Follow-up care is a key part of your treatment and safety. Be sure to make and go to all appointments, and call your doctor if you are having problems. It's also a good idea to know your test results and keep a list of the medicines you take. How can you care for yourself at home? · Ask your family, friends, and coworkers for support. You have a better chance of quitting if you have help and support. · Join a support group, such as Nicotine Anonymous, for people who are trying to quit smoking. · Consider signing up for a smoking cessation program, such as the American Lung Association's Freedom from Smoking program.  · Get text messaging support. Go to the website at www.smokefree. gov to sign up for the [facility] program.  · Set a quit date. Pick your date carefully so that it is not right in the middle of a big deadline or stressful time. Once you quit, do not even take a puff. Get rid of all ashtrays and lighters after your last cigarette. Clean your house and your clothes so that they do not smell of smoke. · Learn how to be a nonsmoker. Think about ways you can avoid those things that make you reach for a cigarette. ? Avoid situations that put you at greatest risk for smoking. For some people, it is hard to have a drink with friends without smoking. For others, they might skip a coffee break with coworkers who smoke. ? Change your daily routine.  Take a different route to work

## 2020-08-28 ENCOUNTER — TELEPHONE (OUTPATIENT)
Dept: FAMILY MEDICINE CLINIC | Age: 55
End: 2020-08-28

## 2020-08-28 LAB
C-REACTIVE PROTEIN, HIGH SENSITIVITY: 2.6 MG/L
HOMOCYSTEINE: 10 UMOL/L
T3 FREE: 3 PG/ML (ref 2.02–4.43)
THYROXINE (T4): 6.9 UG/DL (ref 4.5–10.9)

## 2020-08-28 NOTE — TELEPHONE ENCOUNTER
Pt called office stating she saw Dr. Ramos Back and was advised she needs to see GI, she prefers to go to [place]. Pt wants to know who you would recommend, just looking for recommendations and then she will call insurance to see if they are covered. I advised usually she would call insurance and get a list of covered GI and you would pick one. She just wants your opinion. Please advise.

## 2020-08-30 LAB
ANA SCREEN: NORMAL
CELIAC SEROLOGY: NORMAL
THYROGLOBULIN: NORMAL

## 2020-08-31 ENCOUNTER — TELEMEDICINE (OUTPATIENT)
Dept: FAMILY MEDICINE CLINIC | Age: 55
End: 2020-08-31
Payer: MEDICAID

## 2020-08-31 LAB
DHEAS (DHEA SULFATE): 255 UG/DL (ref 26–200)
THYROID PEROXIDASE ANTIBODY: 12.4 IU/ML (ref 0–35)

## 2020-08-31 PROCEDURE — 99214 OFFICE O/P EST MOD 30 MIN: CPT | Performed by: FAMILY MEDICINE

## 2020-08-31 PROCEDURE — G8428 CUR MEDS NOT DOCUMENT: HCPCS | Performed by: FAMILY MEDICINE

## 2020-08-31 PROCEDURE — 3017F COLORECTAL CA SCREEN DOC REV: CPT | Performed by: FAMILY MEDICINE

## 2020-08-31 NOTE — PROGRESS NOTES
Subjective:      Patient ID: Cara Iglesias is a 48 y.o. female. HPI:    Chief Complaint   Patient presents with    Abnormal Lab     Cara Iglesias (:  1965) has requested an audio/video evaluation for the following concern(s):    Pt presents today to review recent labs with Dr. Jah Banks. Pt consulted Dr. Minerva Vaughan due to ongoing issues with loose stools, abd bloating and inability to lose weight. She is eating a fairly healthy diet and was getting frustrated. Weight is up 20 lbs over the last 3 yrs. Wt Readings from Last 3 Encounters:   20 205 lb 3.2 oz (93.1 kg)   20 198 lb 14.4 oz (90.2 kg)   17 185 lb 1.6 oz (84 kg)     She is taking multiple supplements and has for years. Dr. Minerva Vaughan recently contacted her and made some changes. Labs so far wnl other than elevated lipase. No pain. No hx of pancreatitis. She does admit to social alcohol but never in excess. No blood in stool. Requesting GI referral at [facility] due to the elevated lipase. Patient Active Problem List   Diagnosis    Obstructive sleep apnea    Snoring    Sleep disturbance    Daytime somnolence    Hypertension     Past Surgical History:   Procedure Laterality Date    ADENOIDECTOMY      APPENDECTOMY      BLADDER SUSPENSION      FOOT SURGERY      KNEE ARTHROSCOPY      MYRINGOTOMY AND TYMPANOSTOMY TUBE PLACEMENT      TONSILLECTOMY      TUBAL LIGATION       Prior to Admission medications    Medication Sig Start Date End Date Taking?  Authorizing Provider   NONFORMULARY 2 drops daily Sovereign silver bio-active drops    Historical Provider, MD   NONFORMULARY Gutamin 1 tablet daily    Historical Provider, MD   QUERCETIN PO Take by mouth daily    Historical Provider, MD   ferrous sulfate (FE TABS 325) 325 (65 Fe) MG EC tablet Take 325 mg by mouth daily as needed    Historical Provider, MD   Medium Chain Triglycerides (MCT OIL PO) Take by mouth daily    Historical Provider, MD   Psyllium (DAILY FIBER PO) Take by mouth as needed    Historical Provider, MD   5-Hydroxytryptophan (5-HTP PO) Take by mouth nightly as needed    Historical Provider, MD   NONFORMULARY Keto Elevate powder- daily    Historical Provider, MD   NONFORMULARY CBD OIL - PRN    Historical Provider, MD   ASHWAGANDHA PO Take by mouth    Historical Provider, MD   Pyridoxine HCl (VITAMIN B6 PO) Take by mouth    Historical Provider, MD   Cyanocobalamin (VITAMIN B12 PO) Take by mouth    Historical Provider, MD   Omega-3 Fatty Acids (OMEGA-3 PO) Take by mouth    Historical Provider, MD   Ascorbic Acid (VITAMIN C PO) Take by mouth    Historical Provider, MD   BIOTIN PO Take by mouth    Historical Provider, MD   MAGNESIUM-POTASSIUM PO Take by mouth    Historical Provider, MD   Probiotic Product (PROBIOTIC DAILY PO) Take by mouth    Historical Provider, MD   Multiple Vitamins-Minerals (THERAPEUTIC MULTIVITAMIN-MINERALS) tablet Take 1 tablet by mouth daily.     Historical Provider, MD       Lab Results   Component Value Date    LABA1C 5.4 07/07/2020     No results found for: EAG    No components found for: CHLPL  Lab Results   Component Value Date    TRIG 124 07/07/2020     Lab Results   Component Value Date    HDL 75 07/07/2020     Lab Results   Component Value Date    LDLCALC 115 07/07/2020     No results found for: LABVLDL      Chemistry        Component Value Date/Time     07/07/2020 1117    K 4.7 07/07/2020 1117     07/07/2020 1117    CO2 24 07/07/2020 1117    BUN 14 07/07/2020 1117    CREATININE 0.7 07/07/2020 1117        Component Value Date/Time    CALCIUM 9.5 08/27/2020 1309    ALKPHOS 111 07/07/2020 1117    AST 18 07/07/2020 1117    ALT 20 07/07/2020 1117    BILITOT 0.2 (L) 07/07/2020 1117            Lab Results   Component Value Date    TSH 1.300 07/07/2020    D4PNOXB 110 08/27/2020       Lab Results   Component Value Date    WBC 8.4 08/27/2020    HGB 15.6 08/27/2020    HCT 46.1 08/27/2020    MCV 98.5 08/27/2020     08/27/2020 Health Maintenance   Topic Date Due    Pneumococcal 0-64 years Vaccine (1 of 1 - PPSV23) 07/06/1972    HIV screen  07/06/1981    Cervical cancer screen  07/06/1987    Breast cancer screen  07/06/2016    Shingles Vaccine (1 of 2) 07/06/2016    Flu vaccine (1) 09/01/2020    DTaP/Tdap/Td vaccine (5 - Td) 10/14/2020    Diabetes screen  07/07/2023    Lipid screen  07/07/2025    Colon cancer screen colonoscopy  03/28/2029    Hepatitis A vaccine  Aged Out    Hepatitis B vaccine  Aged Out    Hib vaccine  Aged Out    Meningococcal (ACWY) vaccine  Aged [employer] History   Administered Date(s) Administered    DTaP vaccine 08/19/1970, 02/01/2010, 04/01/2010    Hepatitis B 07/27/2010    Hepatitis B Ped/Adol (Engerix-B, Recombivax HB) 02/01/2010, 04/01/2010    MMR 08/19/1970, 04/01/2010    Tdap (Boostrix, Adacel) 10/14/2010    Varicella (Varivax) 10/14/2010         Review of Systems   Constitutional: Positive for fatigue and unexpected weight change. HENT: Negative. Respiratory: Negative. Cardiovascular: Negative. Gastrointestinal: Positive for diarrhea. Negative for abdominal pain, blood in stool and nausea. Musculoskeletal: Negative. All other systems reviewed and are negative. Objective:   Physical Exam  Constitutional:       General: She is not in acute distress. Appearance: Normal appearance. She is well-developed. She is not ill-appearing. HENT:      Head: Normocephalic and atraumatic. Right Ear: External ear normal.      Left Ear: External ear normal.   Eyes:      Conjunctiva/sclera: Conjunctivae normal.   Pulmonary:      Effort: Pulmonary effort is normal. No respiratory distress. Skin:     Findings: No rash (on exposed surfaces). Neurological:      Mental Status: She is alert and oriented to person, place, and time. Psychiatric:         Mood and Affect: Mood normal.         Behavior: Behavior normal.         Thought Content:  Thought content normal.         Judgment: Judgment normal.         Assessment:       Diagnosis Orders   1. Elevated lipase  Amylase    Lipase   2. Loose stools     3. Abdominal bloating     4. Weight gain     5. Daytime somnolence     6. Tobacco abuse             Plan:      -  Labs in Epic reviewed  -  Will hold off on GI referral for now, will start with repeating above labs  -  Recommend holding her supplements for a few weeks, abdominal symptoms may be related to one of them  -  RTO prn for now, will call with results and recommendations after seeing labs    Due to this being a TeleHealth encounter (During EBFTS-87 public health emergency), evaluation of the following organ systems was limited: Vitals/Constitutional/EENT/Resp/CV/GI//MS/Neuro/Skin/Heme-Lymph-Imm. Pursuant to the emergency declaration under the [address] [place] [address], [address] [place] [address] [facility] authority and the [place] community health and [employer] Act, this Virtual Visit was conducted with patient's (and/or legal guardian's) consent, to reduce the patient's risk of exposure to COVID-19 and provide necessary medical care. The patient (and/or legal guardian) has also been advised to contact this office for worsening conditions or problems, and seek emergency medical treatment and/or call 911 if deemed necessary. Patient identification was verified at the start of the visit: Yes    Total time spent for this encounter: Not billed by time    Services were provided through a video synchronous discussion virtually to substitute for in-person clinic visit. Patient and provider were located at their individual homes.           Donata Level, DO

## 2020-09-01 ENCOUNTER — NURSE ONLY (OUTPATIENT)
Dept: LAB | Age: 55
End: 2020-09-01

## 2020-09-01 LAB
AMYLASE: 60 U/L (ref 20–104)
LIPASE: 63.6 U/L (ref 5.6–51.3)

## 2020-09-01 ASSESSMENT — ENCOUNTER SYMPTOMS
BLOOD IN STOOL: 0
RESPIRATORY NEGATIVE: 1
ABDOMINAL PAIN: 0
NAUSEA: 0
DIARRHEA: 1

## 2020-09-02 LAB
DHEA UNCONJUGATED: 4.14 NG/ML (ref 0.63–4.7)
TESTOSTERONE, FREE W SHGB, FEMALES/CHILDREN: NORMAL

## 2020-09-15 ENCOUNTER — NURSE ONLY (OUTPATIENT)
Dept: LAB | Age: 55
End: 2020-09-15

## 2020-09-15 LAB — LIPASE: 63.4 U/L (ref 5.6–51.3)

## 2020-09-16 ENCOUNTER — TELEPHONE (OUTPATIENT)
Dept: FAMILY MEDICINE CLINIC | Age: 55
End: 2020-09-16

## 2020-09-16 NOTE — TELEPHONE ENCOUNTER
Latia calls stating her MyChart is telling her she is overdue for \"HIV screening\". She is asking if that is necessary or something new? She has not symptoms of needing this so is just asking. Ok to respond in her MyChart.

## 2020-09-17 ENCOUNTER — HOSPITAL ENCOUNTER (OUTPATIENT)
Dept: ULTRASOUND IMAGING | Age: 55
Discharge: HOME | End: 2020-09-17
Payer: MEDICAID

## 2020-09-17 ENCOUNTER — HOSPITAL ENCOUNTER (OUTPATIENT)
Dept: MAMMOGRAPHY | Age: 55
Discharge: HOME | End: 2020-09-17
Payer: MEDICAID

## 2020-09-17 PROCEDURE — 77063 BREAST TOMOSYNTHESIS BI: CPT

## 2020-09-17 PROCEDURE — 76700 US EXAM ABDOM COMPLETE: CPT

## 2020-10-21 ENCOUNTER — PATIENT MESSAGE (OUTPATIENT)
Dept: FAMILY MEDICINE CLINIC | Age: 55
End: 2020-10-21

## 2020-10-21 ENCOUNTER — INITIAL CONSULT (OUTPATIENT)
Dept: PULMONOLOGY | Age: 55
End: 2020-10-21
Payer: MEDICAID

## 2020-10-21 VITALS
WEIGHT: 207.8 LBS | DIASTOLIC BLOOD PRESSURE: 70 MMHG | HEIGHT: 62 IN | HEART RATE: 86 BPM | SYSTOLIC BLOOD PRESSURE: 122 MMHG | BODY MASS INDEX: 38.24 KG/M2 | OXYGEN SATURATION: 98 %

## 2020-10-21 PROCEDURE — G8417 CALC BMI ABV UP PARAM F/U: HCPCS | Performed by: NURSE PRACTITIONER

## 2020-10-21 PROCEDURE — G8427 DOCREV CUR MEDS BY ELIG CLIN: HCPCS | Performed by: NURSE PRACTITIONER

## 2020-10-21 PROCEDURE — 4004F PT TOBACCO SCREEN RCVD TLK: CPT | Performed by: NURSE PRACTITIONER

## 2020-10-21 PROCEDURE — 99204 OFFICE O/P NEW MOD 45 MIN: CPT | Performed by: NURSE PRACTITIONER

## 2020-10-21 PROCEDURE — G8484 FLU IMMUNIZE NO ADMIN: HCPCS | Performed by: NURSE PRACTITIONER

## 2020-10-21 PROCEDURE — 3017F COLORECTAL CA SCREEN DOC REV: CPT | Performed by: NURSE PRACTITIONER

## 2020-10-21 RX ORDER — GUAIFENESIN 400 MG/1
400 TABLET ORAL PRN
COMMUNITY
End: 2021-02-23 | Stop reason: ALTCHOICE

## 2020-10-21 ASSESSMENT — ENCOUNTER SYMPTOMS
EYES NEGATIVE: 1
ABDOMINAL PAIN: 0
WHEEZING: 0
DIARRHEA: 0
VOMITING: 0
COUGH: 0
NAUSEA: 0
SHORTNESS OF BREATH: 0

## 2020-10-21 NOTE — PATIENT INSTRUCTIONS
Patient Education        Learning About Benefits From Quitting Smoking  How does quitting smoking make you healthier? If you're thinking about quitting smoking, you may have a few reasons to be smoke-free. Your health may be one of them. · When you quit smoking, you lower your risks for cancer, lung disease, heart attack, stroke, blood vessel disease, and blindness from macular degeneration. · When you're smoke-free, you get sick less often, and you heal faster. You are less likely to get colds, flu, bronchitis, and pneumonia. · As a nonsmoker, you may find that your mood is better and you are less stressed. When and how will you feel healthier? Quitting has real health benefits that start from day 1 of being smoke-free. And the longer you stay smoke-free, the healthier you get and the better you feel. The first hours  · After just 20 minutes, your blood pressure and heart rate go down. That means there's less stress on your heart and blood vessels. · Within 12 hours, the level of carbon monoxide in your blood drops back to normal. That makes room for more oxygen. With more oxygen in your body, you may notice that you have more energy than when you smoked. After 2 weeks  · Your lungs start to work better. · Your risk of heart attack starts to drop. After 1 month  · When your lungs are clear, you cough less and breathe deeper, so it's easier to be active. · Your sense of taste and smell return. That means you can enjoy food more than you have since you started smoking. Over the years  · Over the years, your risks of heart disease, heart attack, and stroke are lower. · After 10 years, your risk of dying from lung cancer is cut by about half. And your risk for many other types of cancer is lower too. How would quitting help others in your life? When you quit smoking, you improve the health of everyone who now breathes in your smoke.   · Their heart, lung, and cancer risks drop, much like yours.  · They are sick less. For babies and small children, living smoke-free means they're less likely to have ear infections, pneumonia, and bronchitis. · If you're a woman who is or will be pregnant someday, quitting smoking means a healthier . · Children who are close to you are less likely to become adult smokers. Where can you learn more? Go to https://[employer]. org and sign in to your [employer] account. Enter [phone] in the [employer] box to learn more about \"Learning About Benefits From Quitting Smoking. \"     If you do not have an account, please click on the \"Sign Up Now\" link. Current as of: 2020               Content Version: 12.6  © 6257-7752 [employer]. Care instructions adapted under license by [facility] ([facility]). If you have questions about a medical condition or this instruction, always ask your healthcare professional. [place] [address] any warranty or liability for your use of this information.

## 2020-10-21 NOTE — PROGRESS NOTES
[facility]    Patient: Alphonse Ferrera, 48 y.o.   : 1965  10/21/2020    Pt of Dr. Antoni Santiago   Patient presents with    New Patient     Sleep consult, last seen Dr. Paul Lacy in , no prior studies        HPI  Heena Klein is here for to be evaluated for possible sleep apnea. Symptoms include snoring, decreased memory, decreased concentration, excessive daytime sleepiness, uncomfortable temperatures causing tossing and turning, awakening in the middle of the night because of urination, and her dogs waking her up. increased in weight  30 pds, sinus problems, congested nose. Heena Klein does not have improvement in symptoms. Heena Klein states symptoms started many years ago, seen by Dr Paul Lacy MD in  , no sleep study has ever been done  C/o non restorative sleep, has been more of a light sleeper now,   Not on prescription meds, takes multi vitamins   Sleep Hx   SLEEP HISTORY    Sleep Symptoms  This has been evaluated or treated before. Sleep related complaints include ( see above) . Heena Klein denies any history of seizures, sleep walking or talking and there is no history suggestive of bruxism or restless leg syndrome. Bedtime Narative  She goes to bed at 9:30 pm and wakes up at 0400. Heena Klein reports that this is not different on the weekends. Most of the time, ittakes her less than 5 min to fall asleep without difficulty falling back to sleep if she awakens, usually because she has to go to the bathroom. Nap History  Precious does take naps very often. If she does, they are helpful. Snoring History  Alfredo snore or has been told she snores. There have not been witnessed apneas. She does not awakenwith choking or gasping.     Mitzi Gonzalez does not have recurring dreams, and specifically does not have episodes of feeling paralyzed while awake, or anything to suggest cataplexy or dreams she would describe as Father         multiple myoloma    No Known Problems Sister     No Known Problems Brother      CURRENT MEDICATIONS:  Current Outpatient Medications   Medication Sig Dispense Refill    guaiFENesin 400 MG tablet Take 400 mg by mouth as needed for Cough      NONFORMULARY 2 drops daily Sovereign silver bio-active drops      NONFORMULARY Gutamin 1 tablet daily      QUERCETIN PO Take by mouth daily      ferrous sulfate (FE TABS 325) 325 (65 Fe) MG EC tablet Take 325 mg by mouth daily as needed      Medium Chain Triglycerides (MCT OIL PO) Take by mouth daily      Psyllium (DAILY FIBER PO) Take by mouth as needed      5-Hydroxytryptophan (5-HTP PO) Take by mouth nightly as needed      NONFORMULARY Keto Elevate powder- daily      NONFORMULARY CBD OIL - PRN      ASHWAGANDHA PO Take by mouth      Pyridoxine HCl (VITAMIN B6 PO) Take by mouth      Cyanocobalamin (VITAMIN B12 PO) Take by mouth      Omega-3 Fatty Acids (OMEGA-3 PO) Take by mouth      Ascorbic Acid (VITAMIN C PO) Take by mouth      BIOTIN PO Take by mouth      MAGNESIUM-POTASSIUM PO Take by mouth      Probiotic Product (PROBIOTIC DAILY PO) Take by mouth      Multiple Vitamins-Minerals (THERAPEUTIC MULTIVITAMIN-MINERALS) tablet Take 1 tablet by mouth daily. No current facility-administered medications for this visit. Arsh HUI   Review of Systems   Constitutional: Positive for fatigue. Negative for chills and fever. HENT: Positive for congestion. History of deviated septum    Eyes: Negative. Respiratory: Negative for cough, shortness of breath and wheezing. Cardiovascular: Negative for chest pain, palpitations and leg swelling. Gastrointestinal: Negative for abdominal pain, diarrhea, nausea and vomiting. Genitourinary: Negative. Musculoskeletal: Negative. Skin: Negative. Neurological: Negative. Hematological: Does not bruise/bleed easily. Psychiatric/Behavioral: Positive for sleep disturbance.  Negative for suicidal ideas. Physical exam   /70 (Site: Left Upper Arm, Position: Sitting, Cuff Size: Large Adult)   Pulse 86   Ht 5' 2\" (1.575 m)   Wt 207 lb 12.8 oz (94.3 kg)   SpO2 98% Comment: on room air at rest  BMI 38.01 kg/m²      Physical Exam  Vitals signs and nursing note reviewed. Constitutional:       General: She is not in acute distress. Appearance: She is well-developed. HENT:      Mouth/Throat:      Lips: Pink. Mouth: Mucous membranes are moist.      Pharynx: Oropharynx is clear. No oropharyngeal exudate or posterior oropharyngeal erythema. Eyes:      Conjunctiva/sclera: Conjunctivae normal.   Neck:      Vascular: No JVD. Cardiovascular:      Rate and Rhythm: Normal rate and regular rhythm. Heart sounds: No murmur. No friction rub. Pulmonary:      Effort: Pulmonary effort is normal. No accessory muscle usage or respiratory distress. Breath sounds: Normal breath sounds. No wheezing, rhonchi or rales. Chest:      Chest wall: No tenderness. Musculoskeletal:      Right lower leg: No edema. Left lower leg: No edema. Skin:     General: Skin is warm and dry. Capillary Refill: Capillary refill takes less than 2 seconds. Nails: There is no clubbing. Neurological:      Mental Status: She is alert. Psychiatric:         Mood and Affect: Mood normal.         Behavior: Behavior normal.         Thought Content: Thought content normal.         Judgment: Judgment normal.          Sleep Study   None   Assessment      Diagnosis Orders   1. Hypersomnia  Baseline Diagnostic Sleep Study    Assessment of Daytime Sleepiness    Urine Drug Screen   2. Loud snoring  Baseline Diagnostic Sleep Study    Assessment of Daytime Sleepiness   3. Obesity (BMI 30-39. 9)  Baseline Diagnostic Sleep Study    Assessment of Daytime Sleepiness   4. Deviated septum  Baseline Diagnostic Sleep Study    Assessment of Daytime Sleepiness   5.  Non-restorative sleep  Assessment of Daytime Sleepiness    Urine Drug Screen     Plan   · Sleep hygiene  discussed,  Light-blocking shades, avoid caffeine before bed, do not eat large meals before bed, avoidance of spicy foods if history of GERD, cool temperature (between 60-75), white noise machine or noise cancelling headphones, avoid all electronics 30-60- minutes before trying to go to sleep, drinking warm decaffeinated beverages, lavender essential oil spray on pillowcase/ in a diffuser     -Baseline PSG to r/o KEN, followed by nap study if qualifies  -Urine drug screen for MSLT  -Counseled on weight loss and incorporating exercise      Electronically signed by PRITESH Looney CNP on 10/21/2020 at 3:37 PM

## 2020-10-21 NOTE — TELEPHONE ENCOUNTER
From: Jacky Abarca  To: Thony Holliday DO  Sent: 10/21/2020 11:15 AM EDT  Subject: Non-Urgent Medical Question    Could you please refer me for a sleep study. I snore something fierce and very loudly, and have for years. ..lol  Thank you so very much,   [facility] [employer]   [phone]

## 2020-11-30 ENCOUNTER — VIRTUAL VISIT (OUTPATIENT)
Dept: FAMILY MEDICINE CLINIC | Age: 55
End: 2020-11-30
Payer: MEDICAID

## 2020-11-30 ENCOUNTER — NURSE ONLY (OUTPATIENT)
Dept: LAB | Age: 55
End: 2020-11-30

## 2020-11-30 PROCEDURE — 99214 OFFICE O/P EST MOD 30 MIN: CPT | Performed by: FAMILY MEDICINE

## 2020-11-30 PROCEDURE — G8428 CUR MEDS NOT DOCUMENT: HCPCS | Performed by: FAMILY MEDICINE

## 2020-11-30 PROCEDURE — 3017F COLORECTAL CA SCREEN DOC REV: CPT | Performed by: FAMILY MEDICINE

## 2020-11-30 RX ORDER — FUROSEMIDE 20 MG/1
20 TABLET ORAL DAILY
Qty: 7 TABLET | Refills: 0 | Status: SHIPPED | OUTPATIENT
Start: 2020-11-30 | End: 2020-12-07

## 2020-11-30 NOTE — PROGRESS NOTES
Subjective:      Patient ID: Nicolas Poe is a 48 y.o. female. HPI:    Chief Complaint   Patient presents with    Leg Swelling       Precious Abarca (:  1965) has requested an audio/video evaluation for the following concern(s):    Pt here to discuss her leg swelling and weight gain. Her whole right leg is bigger than her left leg. No pain, no redness. No hx of DVT. No risk factors for DVT. Per pt, the edema is pitting in nature. Was seen by GI for chronic stool issues and elevated lipase. Orders numerous stool studies. Per pt, current weight is 212 lbs, up from previous visits. Wt Readings from Last 3 Encounters:   20 212 lb (96.2 kg)   10/21/20 207 lb 12.8 oz (94.3 kg)   20 205 lb 3.2 oz (93.1 kg)     Patient Active Problem List   Diagnosis    Obstructive sleep apnea    Snoring    Sleep disturbance    Daytime somnolence    Hypertension     Past Surgical History:   Procedure Laterality Date    ADENOIDECTOMY      APPENDECTOMY      BLADDER SUSPENSION      FOOT SURGERY      KNEE ARTHROSCOPY      MYRINGOTOMY AND TYMPANOSTOMY TUBE PLACEMENT      OVARIAN CYST SURGERY      \"in high school\"    TONSILLECTOMY      TUBAL LIGATION       Prior to Admission medications    Medication Sig Start Date End Date Taking?  Authorizing Provider   furosemide (LASIX) 20 MG tablet Take 1 tablet by mouth daily for 7 days 20 Yes Alvaro Cote DO   PEG-KCl-NaCl-NaSulf-Na Asc-C (PLENVU) 140 g SOLR Take 1 kit by mouth See Admin Instructions 20   PRITESH Brown CNP   famotidine (PEPCID) 20 MG tablet Take 1 tablet by mouth 2 times daily (before meals) 20   PRITESH Brown CNP   guaiFENesin 400 MG tablet Take 400 mg by mouth as needed for Cough    Historical Provider, MD   NONFORMULARY 2 drops daily Sovereign silver bio-active drops    Historical Provider, MD   NONFORMULARY Gutamin 1 tablet daily    Historical Provider, MD   QUERCETIN PO Take by mouth daily    Historical Provider, MD   ferrous sulfate (FE TABS 325) 325 (65 Fe) MG EC tablet Take 325 mg by mouth daily as needed    Historical Provider, MD   Medium Chain Triglycerides (MCT OIL PO) Take by mouth daily    Historical Provider, MD   Psyllium (DAILY FIBER PO) Take by mouth as needed    Historical Provider, MD   5-Hydroxytryptophan (5-HTP PO) Take by mouth nightly as needed    Historical Provider, MD   NONFORMULARY Keto Elevate powder- daily    Historical Provider, MD   NONFORMULARY CBD OIL - PRN    Historical Provider, MD   ASHWAGANDHA PO Take by mouth    Historical Provider, MD   Pyridoxine HCl (VITAMIN B6 PO) Take by mouth    Historical Provider, MD   Cyanocobalamin (VITAMIN B12 PO) Take by mouth    Historical Provider, MD   Omega-3 Fatty Acids (OMEGA-3 PO) Take by mouth    Historical Provider, MD   Ascorbic Acid (VITAMIN C PO) Take by mouth    Historical Provider, MD   BIOTIN PO Take by mouth    Historical Provider, MD   MAGNESIUM-POTASSIUM PO Take by mouth    Historical Provider, MD   Probiotic Product (PROBIOTIC DAILY PO) Take by mouth    Historical Provider, MD   Multiple Vitamins-Minerals (THERAPEUTIC MULTIVITAMIN-MINERALS) tablet Take 1 tablet by mouth daily. Historical Provider, MD       Review of Systems   Constitutional: Positive for unexpected weight change. HENT: Negative. Respiratory: Negative. Cardiovascular: Positive for leg swelling. Gastrointestinal: Negative. Musculoskeletal: Negative. All other systems reviewed and are negative. Objective:   Physical Exam  Constitutional:       General: She is not in acute distress. Appearance: Normal appearance. She is well-developed. She is not ill-appearing. HENT:      Head: Normocephalic and atraumatic. Right Ear: External ear normal.      Left Ear: External ear normal.   Eyes:      Conjunctiva/sclera: Conjunctivae normal.   Pulmonary:      Effort: Pulmonary effort is normal. No respiratory distress. Skin:     Findings: No rash (on exposed surfaces). Neurological:      Mental Status: She is alert and oriented to person, place, and time. Psychiatric:         Mood and Affect: Mood normal.         Behavior: Behavior normal.         Thought Content: Thought content normal.         Judgment: Judgment normal.         Assessment:       Diagnosis Orders   1. Leg edema, right  furosemide (LASIX) 20 MG tablet   2. Unintended weight gain     3. Elevated lipase     4. Abdominal bloating     5. Loose stools     6. Tobacco abuse             Plan:      -  Uncertain etiology of her weight gain and edema at this time  -  Low risk for DVT, will hold off on US  -  LE edema pitting in nature per pt, will try short course of Lasix  -  Chronic medical problems otherwise stable  -  Continue current medications  -  Follow up with specialists as scheduled, GI to perform labs, EGD, colonoscopy in near future  -  RTO in-office next week    Due to this being a TeleHealth encounter (During APKHQ-51 public health emergency), evaluation of the following organ systems was limited: Vitals/Constitutional/EENT/Resp/CV/GI//MS/Neuro/Skin/Heme-Lymph-Imm. Pursuant to the emergency declaration under the [facility], [address] [place] [address] authority and the [employer] and [employer] Act, this Virtual Visit was conducted with patient's (and/or legal guardian's) consent, to reduce the patient's risk of exposure to COVID-19 and provide necessary medical care. The patient (and/or legal guardian) has also been advised to contact this office for worsening conditions or problems, and seek emergency medical treatment and/or call 911 if deemed necessary.      Patient identification was verified at the start of the visit: Yes    Total time spent for this encounter: Not billed by time    Services were provided through a video synchronous discussion virtually to substitute for in-person clinic

## 2020-12-01 LAB — FECAL LEUKOCYTES: NORMAL

## 2020-12-02 ASSESSMENT — ENCOUNTER SYMPTOMS
GASTROINTESTINAL NEGATIVE: 1
RESPIRATORY NEGATIVE: 1

## 2020-12-03 LAB
CALPROTECTIN: 15 UG/G
CELIAC SEROLOGY: NORMAL
CULTURE, STOOL: NORMAL
PANCREATIC ELASTASE, FECAL: > 800 UG/G

## 2020-12-07 ENCOUNTER — OFFICE VISIT (OUTPATIENT)
Dept: FAMILY MEDICINE CLINIC | Age: 55
End: 2020-12-07
Payer: MEDICAID

## 2020-12-07 VITALS
WEIGHT: 212.2 LBS | BODY MASS INDEX: 38.81 KG/M2 | HEART RATE: 80 BPM | DIASTOLIC BLOOD PRESSURE: 72 MMHG | SYSTOLIC BLOOD PRESSURE: 128 MMHG | TEMPERATURE: 97 F | RESPIRATION RATE: 16 BRPM

## 2020-12-07 PROCEDURE — 99214 OFFICE O/P EST MOD 30 MIN: CPT | Performed by: FAMILY MEDICINE

## 2020-12-07 PROCEDURE — G8427 DOCREV CUR MEDS BY ELIG CLIN: HCPCS | Performed by: FAMILY MEDICINE

## 2020-12-07 PROCEDURE — 4004F PT TOBACCO SCREEN RCVD TLK: CPT | Performed by: FAMILY MEDICINE

## 2020-12-07 PROCEDURE — G8417 CALC BMI ABV UP PARAM F/U: HCPCS | Performed by: FAMILY MEDICINE

## 2020-12-07 PROCEDURE — 3017F COLORECTAL CA SCREEN DOC REV: CPT | Performed by: FAMILY MEDICINE

## 2020-12-07 PROCEDURE — G8484 FLU IMMUNIZE NO ADMIN: HCPCS | Performed by: FAMILY MEDICINE

## 2020-12-07 RX ORDER — VENLAFAXINE HYDROCHLORIDE 37.5 MG/1
37.5 CAPSULE, EXTENDED RELEASE ORAL DAILY
Qty: 14 CAPSULE | Refills: 0 | Status: SHIPPED | OUTPATIENT
Start: 2020-12-07 | End: 2021-01-30

## 2020-12-07 ASSESSMENT — ENCOUNTER SYMPTOMS
GASTROINTESTINAL NEGATIVE: 1
RESPIRATORY NEGATIVE: 1

## 2020-12-07 NOTE — PROGRESS NOTES
Subjective:      Patient ID: Anatoly Linda is a 48 y.o. female. HPI:    Chief Complaint   Patient presents with    Leg Swelling     right leg     Pt here for follow up on her right leg. See previous PN in this regard. Weight has not changed much despite Lasix. Wt Readings from Last 3 Encounters:   12/07/20 212 lb 3.2 oz (96.3 kg)   11/30/20 212 lb (96.2 kg)   10/21/20 207 lb 12.8 oz (94.3 kg)     BP looks good. BP Readings from Last 3 Encounters:   12/07/20 128/72   11/30/20 (!) 154/92   10/21/20 122/70     Pt has noticed slight improvement in the LE swelling on the Lasix. She has a hx of meniscal damage to her right knee, s/p scope 5-6 yrs ago. The knee will swell at times. Pt does admit to some periodic knee pain at times. Pt also c/o hot flashes, this started a few months ago. She is still having occasional period. She will go months between her periods. She is spotting only. She is UTD with her pap. She has noticed some mood swings. Patient Active Problem List   Diagnosis    Obstructive sleep apnea    Snoring    Sleep disturbance    Daytime somnolence    Hypertension     Past Surgical History:   Procedure Laterality Date    ADENOIDECTOMY      APPENDECTOMY      BLADDER SUSPENSION      FOOT SURGERY      KNEE ARTHROSCOPY      MYRINGOTOMY AND TYMPANOSTOMY TUBE PLACEMENT      OVARIAN CYST SURGERY      \"in high school\"    TONSILLECTOMY      TUBAL LIGATION       Prior to Admission medications    Medication Sig Start Date End Date Taking?  Authorizing Provider   PEG-KCl-NaCl-NaSulf-Na Asc-C (PLENVU) 140 g SOLR Take 1 kit by mouth See Admin Instructions  Patient not taking: Reported on 12/7/2020 11/30/20   PRITESH Choi CNP   famotidine (PEPCID) 20 MG tablet Take 1 tablet by mouth 2 times daily (before meals)  Patient not taking: Reported on 12/7/2020 11/30/20   PRITESH Choi CNP   furosemide (LASIX) 20 MG tablet Take 1 tablet by mouth daily for 7 days  Patient not taking: Reported on 12/7/2020 11/30/20 12/7/20  Sukhi Upton DO   guaiFENesin 400 MG tablet Take 400 mg by mouth as needed for Cough    Historical Provider, MD   NONFORMULARY 2 drops daily Sovereign silver bio-active drops    Historical Provider, MD   NONFORMULARY Gutamin 1 tablet daily    Historical Provider, MD   QUERCETIN PO Take by mouth daily    Historical Provider, MD   ferrous sulfate (FE TABS 325) 325 (65 Fe) MG EC tablet Take 325 mg by mouth daily as needed    Historical Provider, MD   Medium Chain Triglycerides (MCT OIL PO) Take by mouth daily    Historical Provider, MD   Psyllium (DAILY FIBER PO) Take by mouth as needed    Historical Provider, MD   5-Hydroxytryptophan (5-HTP PO) Take by mouth nightly as needed    Historical Provider, MD   NONFORMULARY Keto Elevate powder- daily    Historical Provider, MD   NONFORMULARY CBD OIL - PRN    Historical Provider, MD FELIPEDHA PO Take by mouth    Historical Provider, MD   Pyridoxine HCl (VITAMIN B6 PO) Take by mouth    Historical Provider, MD   Cyanocobalamin (VITAMIN B12 PO) Take by mouth    Historical Provider, MD   Omega-3 Fatty Acids (OMEGA-3 PO) Take by mouth    Historical Provider, MD   Ascorbic Acid (VITAMIN C PO) Take by mouth    Historical Provider, MD   BIOTIN PO Take by mouth    Historical Provider, MD   MAGNESIUM-POTASSIUM PO Take by mouth    Historical Provider, MD   Probiotic Product (PROBIOTIC DAILY PO) Take by mouth    Historical Provider, MD   Multiple Vitamins-Minerals (THERAPEUTIC MULTIVITAMIN-MINERALS) tablet Take 1 tablet by mouth daily. Historical Provider, MD         Review of Systems   Constitutional: Negative. HENT: Negative. Respiratory: Negative. Cardiovascular: Positive for leg swelling. Gastrointestinal: Negative. Endocrine:        Hot flashes   Musculoskeletal: Positive for arthralgias and joint swelling. All other systems reviewed and are negative.       Objective:   Physical Exam  Vitals signs and nursing note reviewed. Constitutional:       General: She is not in acute distress. Appearance: Normal appearance. She is well-developed. HENT:      Head: Normocephalic and atraumatic. Right Ear: Tympanic membrane normal.      Left Ear: Tympanic membrane normal.   Eyes:      Conjunctiva/sclera: Conjunctivae normal.   Neck:      Musculoskeletal: Neck supple. Cardiovascular:      Rate and Rhythm: Normal rate and regular rhythm. Heart sounds: Normal heart sounds. No murmur. Pulmonary:      Effort: Pulmonary effort is normal.      Breath sounds: Normal breath sounds. No wheezing, rhonchi or rales. Abdominal:      General: There is no distension. Musculoskeletal:      Right lower leg: Edema (trace pittine edema RLE) present. Skin:     General: Skin is warm and dry. Findings: No rash (on exposed surfaces). Neurological:      General: No focal deficit present. Mental Status: She is alert. Psychiatric:         Attention and Perception: Attention normal.         Mood and Affect: Mood normal.         Speech: Speech normal.         Behavior: Behavior normal. Behavior is cooperative. Thought Content: Thought content normal.         Judgment: Judgment normal.         Assessment:       Diagnosis Orders   1. Edema of right lower leg     2. Weight gain     3. Swelling of right knee joint     4. Perimenopausal     5.  Hot flashes  venlafaxine (EFFEXOR XR) 37.5 MG extended release capsule           Plan:      -  Multiple concerns today, each addressed at length  -  #1 likely VI and/or related to her chronic knee issues  -  No red flags  -  Pt reassurance in this regard, recommend LE elevation prn and compression hose  -  No need to continue diuretic for now  -  Start Effexor   -  RTO prn for now, call in 2 weeks with update (plan increase Effexor at that time)        Tasneem Turner DO

## 2020-12-07 NOTE — PATIENT INSTRUCTIONS
You may receive a survey about your visit with us today. The feedback from our patients helps us identify what is working well and where the service to all patients can be enhanced. Thank you! Tobacco Cessation Programs     Telephonic behavior modification  Addison Jackson ([phone])   Counseling service for those who are ready to quit using tobacco.     Available for uninsured [place] residents, [place] recipients, pregnant women, or patients whose health plans or employers are members of the [address] [place] [address] behavior modification   http://[place]. Quitlogix. org   Online support program to help patients through each step of the quitting process. Available 24 hours a day 7 days a week. Provides up to date researched based tool, step-by-step guides, and motivational messages. Online behavior modification   www.lungusa.org/stop-smoking/how-to-quit   HelpLine: 1-800-LUNG-[place] (965-9701)   Email questions to:  Kat@[employer]    Website offers resources to help tobacco users figure out their reasons for quitting and then take the big step of quitting for good. Hypnosis   Location: [facility], [facility], [address] [place] [address] Drive   Contact: Sunday Stuart, PhD at [phone]   Hypnosis for tobacco cessation   Cost $225 for the initial session and $175 for each session afterwards. Most patients require 6-8 sessions. There is the option to submit through the patients insurance. Hypnosis and behavior modification   Location: [place] [address],  [address] [facility], [address]   Contact: Claribel Higuera, PhD at [phone]  [facility] and hypnosis for nicotine addition   Cost: For uninsured patients:  Please call above phone number  Cost for insured patients depends on patients insurance plan.     Behavior modification   Location: [facility], [address] [facility], [address]: [place] [address] include four one-on-one appointments between the patient and a

## 2020-12-07 NOTE — PROGRESS NOTES
Chronic Disease Visit Information    BP Readings from Last 3 Encounters:   11/30/20 (!) 154/92   10/21/20 122/70   08/27/20 122/74          Hemoglobin A1C (%)   Date Value   07/07/2020 5.4     LDL Calculated (mg/dL)   Date Value   07/07/2020 115     HDL (mg/dL)   Date Value   07/07/2020 75     BUN (mg/dL)   Date Value   07/07/2020 14     CREATININE (mg/dL)   Date Value   07/07/2020 0.7     Glucose (mg/dL)   Date Value   07/07/2020 103            Have you changed or started any medications since your last visit including any over-the-counter medicines, vitamins, or herbal medicines? no   Are you having any side effects from any of your medications? -  no  Have you stopped taking any of your medications? Is so, why? -  Yes--all meds due to swelling    Have you seen any other physician or provider since your last visit? No  Have you had any other diagnostic tests since your last visit? No  Have you been seen in the emergency room and/or had an admission to a hospital since we last saw you? No  Have you had your annual diabetic retinal (eye) exam? No  Have you had your routine dental cleaning in the past 6 months? n/a    Have you activated your pay[employer]Mobile account? If not, what are your barriers?  Yes     Patient Care Team:  Debora Grossman DO as PCP - General (Family Medicine)  Debora Grossman DO as PCP - [facility] Provider         Medical History Review  Past Medical, Family, and Social History reviewed and does contribute to the patient presenting condition    Health Maintenance   Topic Date Due    Pneumococcal 0-64 years Vaccine (1 of 1 - PPSV23) 07/06/1972    HIV screen  07/06/1981    Cervical cancer screen  07/06/1987    Shingles Vaccine (1 of 2) 07/06/2016    Flu vaccine (1) 09/01/2020    DTaP/Tdap/Td vaccine (5 - Td) 10/14/2020    Potassium monitoring  07/07/2021    Creatinine monitoring  07/07/2021    Breast cancer screen  09/17/2022    Diabetes screen  07/07/2023    Lipid screen 07/07/2025    Colon cancer screen colonoscopy  03/28/2029    Hepatitis A vaccine  Aged Out    Hepatitis B vaccine  Aged Out    Hib vaccine  Aged Out    Meningococcal (ACWY) vaccine  Aged Out

## 2020-12-10 ENCOUNTER — TELEPHONE (OUTPATIENT)
Dept: SLEEP CENTER | Age: 55
End: 2020-12-10

## 2020-12-10 NOTE — TELEPHONE ENCOUNTER
Gianni Gaffney is scheduled for PSG/MSLT on Monday, 12/14/2020. She called in stating she doesn't want to go forward with MSLT portion of test. She says she doesn't have narcolepsy. She says she is tired from  gaining weight, and getting up with her dogs through the night. She is willing to go forward with the PSG only. (She didn't give a real reason why she can't stay for MSLT) Please advise. Thank you.       Catherine Levi

## 2020-12-14 LAB — MISC. #1 REFERENCE GROUP TEST: NORMAL

## 2020-12-30 ENCOUNTER — TELEPHONE (OUTPATIENT)
Dept: PULMONOLOGY | Age: 55
End: 2020-12-30

## 2020-12-30 NOTE — TELEPHONE ENCOUNTER
Phoned patient to follow up on overdue testing that was not completed, and follow up visit. Per patient she will call back office to schedule.

## 2021-01-18 ENCOUNTER — HOSPITAL ENCOUNTER (OUTPATIENT)
Age: 56
Discharge: HOME | End: 2021-01-18
Payer: MEDICAID

## 2021-01-18 PROCEDURE — U0003 INFECTIOUS AGENT DETECTION BY NUCLEIC ACID (DNA OR RNA); SEVERE ACUTE RESPIRATORY SYNDROME CORONAVIRUS 2 (SARS-COV-2) (CORONAVIRUS DISEASE [COVID-19]), AMPLIFIED PROBE TECHNIQUE, MAKING USE OF HIGH THROUGHPUT TECHNOLOGIES AS DESCRIBED BY CMS-2020-01-R: HCPCS

## 2021-01-19 LAB — SARS-COV-2: NOT DETECTED

## 2021-01-23 ENCOUNTER — NURSE ONLY (OUTPATIENT)
Dept: LAB | Age: 56
End: 2021-01-23

## 2021-01-26 ENCOUNTER — HOSPITAL ENCOUNTER (OUTPATIENT)
Dept: NON INVASIVE DIAGNOSTICS | Age: 56
Discharge: HOME | End: 2021-01-26
Payer: MEDICAID

## 2021-01-26 DIAGNOSIS — R60.0 EDEMA OF RIGHT LOWER LEG: ICD-10-CM

## 2021-01-26 DIAGNOSIS — R63.5 WEIGHT GAIN: ICD-10-CM

## 2021-01-26 LAB
LV EF: 60 %
LVEF MODALITY: NORMAL

## 2021-01-26 PROCEDURE — 93306 TTE W/DOPPLER COMPLETE: CPT

## 2021-07-30 ENCOUNTER — TELEPHONE (OUTPATIENT)
Dept: FAMILY MEDICINE CLINIC | Age: 56
End: 2021-07-30

## 2022-01-11 ENCOUNTER — NURSE ONLY (OUTPATIENT)
Dept: LAB | Age: 57
End: 2022-01-11

## 2022-01-13 ENCOUNTER — NURSE ONLY (OUTPATIENT)
Dept: LAB | Age: 57
End: 2022-01-13

## 2022-01-13 DIAGNOSIS — R74.8 ELEVATED LIPASE: ICD-10-CM

## 2022-01-13 LAB — LIPASE: 67.8 U/L (ref 5.6–51.3)

## 2022-03-31 ENCOUNTER — OFFICE VISIT (OUTPATIENT)
Dept: FAMILY MEDICINE CLINIC | Age: 57
End: 2022-03-31
Payer: COMMERCIAL

## 2022-03-31 ENCOUNTER — TELEPHONE (OUTPATIENT)
Dept: FAMILY MEDICINE CLINIC | Age: 57
End: 2022-03-31

## 2022-03-31 VITALS
HEART RATE: 97 BPM | DIASTOLIC BLOOD PRESSURE: 74 MMHG | OXYGEN SATURATION: 97 % | HEIGHT: 62 IN | SYSTOLIC BLOOD PRESSURE: 136 MMHG | BODY MASS INDEX: 38.83 KG/M2 | WEIGHT: 211 LBS

## 2022-03-31 DIAGNOSIS — Z11.4 SCREENING FOR HUMAN IMMUNODEFICIENCY VIRUS WITHOUT PRESENCE OF RISK FACTORS: ICD-10-CM

## 2022-03-31 DIAGNOSIS — Z13.220 SCREENING CHOLESTEROL LEVEL: ICD-10-CM

## 2022-03-31 DIAGNOSIS — Z01.89 PATIENT REQUEST FOR DIAGNOSTIC TESTING: ICD-10-CM

## 2022-03-31 DIAGNOSIS — K52.9 CHRONIC DIARRHEA: Primary | ICD-10-CM

## 2022-03-31 DIAGNOSIS — Z11.59 ENCOUNTER FOR HEPATITIS C SCREENING TEST FOR LOW RISK PATIENT: ICD-10-CM

## 2022-03-31 DIAGNOSIS — Z12.39 ENCOUNTER FOR OTHER SCREENING FOR MALIGNANT NEOPLASM OF BREAST: ICD-10-CM

## 2022-03-31 DIAGNOSIS — F17.200 CURRENT EVERY DAY SMOKER: ICD-10-CM

## 2022-03-31 PROCEDURE — G8427 DOCREV CUR MEDS BY ELIG CLIN: HCPCS | Performed by: NURSE PRACTITIONER

## 2022-03-31 PROCEDURE — G8484 FLU IMMUNIZE NO ADMIN: HCPCS | Performed by: NURSE PRACTITIONER

## 2022-03-31 PROCEDURE — 4004F PT TOBACCO SCREEN RCVD TLK: CPT | Performed by: NURSE PRACTITIONER

## 2022-03-31 PROCEDURE — 3017F COLORECTAL CA SCREEN DOC REV: CPT | Performed by: NURSE PRACTITIONER

## 2022-03-31 PROCEDURE — G8417 CALC BMI ABV UP PARAM F/U: HCPCS | Performed by: NURSE PRACTITIONER

## 2022-03-31 PROCEDURE — 99204 OFFICE O/P NEW MOD 45 MIN: CPT | Performed by: NURSE PRACTITIONER

## 2022-03-31 RX ORDER — NICOTINE 21 MG/24HR
1 PATCH, TRANSDERMAL 24 HOURS TRANSDERMAL DAILY
Qty: 42 PATCH | Refills: 1 | Status: SHIPPED | OUTPATIENT
Start: 2022-03-31 | End: 2022-05-17

## 2022-03-31 SDOH — HEALTH STABILITY: PHYSICAL HEALTH: ON AVERAGE, HOW MANY DAYS PER WEEK DO YOU ENGAGE IN MODERATE TO STRENUOUS EXERCISE (LIKE A BRISK WALK)?: 3 DAYS

## 2022-03-31 SDOH — ECONOMIC STABILITY: FOOD INSECURITY: WITHIN THE PAST 12 MONTHS, YOU WORRIED THAT YOUR FOOD WOULD RUN OUT BEFORE YOU GOT MONEY TO BUY MORE.: NEVER TRUE

## 2022-03-31 SDOH — HEALTH STABILITY: PHYSICAL HEALTH: ON AVERAGE, HOW MANY MINUTES DO YOU ENGAGE IN EXERCISE AT THIS LEVEL?: 20 MIN

## 2022-03-31 SDOH — ECONOMIC STABILITY: FOOD INSECURITY: WITHIN THE PAST 12 MONTHS, THE FOOD YOU BOUGHT JUST DIDN'T LAST AND YOU DIDN'T HAVE MONEY TO GET MORE.: NEVER TRUE

## 2022-03-31 ASSESSMENT — PATIENT HEALTH QUESTIONNAIRE - PHQ9
1. LITTLE INTEREST OR PLEASURE IN DOING THINGS: 0
SUM OF ALL RESPONSES TO PHQ QUESTIONS 1-9: 0
SUM OF ALL RESPONSES TO PHQ QUESTIONS 1-9: 0
2. FEELING DOWN, DEPRESSED OR HOPELESS: 0
SUM OF ALL RESPONSES TO PHQ9 QUESTIONS 1 & 2: 0
SUM OF ALL RESPONSES TO PHQ QUESTIONS 1-9: 0
SUM OF ALL RESPONSES TO PHQ QUESTIONS 1-9: 0

## 2022-03-31 ASSESSMENT — SOCIAL DETERMINANTS OF HEALTH (SDOH)
WITHIN THE LAST YEAR, HAVE YOU BEEN KICKED, HIT, SLAPPED, OR OTHERWISE PHYSICALLY HURT BY YOUR PARTNER OR EX-PARTNER?: NO
WITHIN THE LAST YEAR, HAVE TO BEEN RAPED OR FORCED TO HAVE ANY KIND OF SEXUAL ACTIVITY BY YOUR PARTNER OR EX-PARTNER?: NO
HOW HARD IS IT FOR YOU TO PAY FOR THE VERY BASICS LIKE FOOD, HOUSING, MEDICAL CARE, AND HEATING?: NOT HARD AT ALL
WITHIN THE LAST YEAR, HAVE YOU BEEN AFRAID OF YOUR PARTNER OR EX-PARTNER?: NO
WITHIN THE LAST YEAR, HAVE YOU BEEN HUMILIATED OR EMOTIONALLY ABUSED IN OTHER WAYS BY YOUR PARTNER OR EX-PARTNER?: NO

## 2022-03-31 ASSESSMENT — ENCOUNTER SYMPTOMS: DIARRHEA: 1

## 2022-03-31 NOTE — PROGRESS NOTES
Phyllis Osorio (:  1965) is a 55 y.o. female, new patient, here for evaluation of the following chief complaint(s):  New Patient (est care; ok with going to [place] )         ASSESSMENT/PLAN:  1. Chronic diarrhea  -     CBC with Auto Differential; Future  -     Comprehensive Metabolic Panel; Future  -     Urinalysis with Microscopic; Future  -     Lipid, Fasting; Future  2. Patient request for diagnostic testing  -     [address] [facility], [place], [address], Allergy, [address]  3. Current every day smoker  -     nicotine (NICODERM CQ) 21 MG/24HR; Place 1 patch onto the skin daily, Disp-42 patch, R-1Normal  4. BMI 38.0-38.9,adult  5. Encounter for hepatitis C screening test for low risk patient  -     HIV Screen; Future  6. Screening for human immunodeficiency virus without presence of risk factors  -     Hepatitis C Antibody; Future  7. Screening cholesterol level  -     Lipid, Fasting; Future  8. Encounter for other screening for malignant neoplasm of breast  -     PENELOPE DIGITAL SCREEN W OR WO CAD BILATERAL; Future      Return in about 6 weeks (around 2022) for follow up smoking. Subjective   SUBJECTIVE/OBJECTIVE:  HPI  Has had bloating and loose stools for 7 years. No specific abdominal pain. No blood in the stool. Stools are not oily or abnormal color or texture. [facility]  and , but missed an appt, so they will not reschedule her. She denies being told she has IBS, however according to those notes she was diagnosed with irritable bowel syndrome with diarrhea predominance and small intestinal bacterial overgrowth syndrome. FODMAP diet was not effective for her. She was treated with doxycycline for the bacterial overgrowth. According to the note from 2017 the plan was that if her symptoms did not improve they would do a HIDA scan with CCK to further evaluate gallbladder. Seen by Dr. Audi Taveras in 2020 because she wanted checked for  food allergies.  Had a lot of blood work done but was never told if she actually had allergies. At that time was found to have elevated lipase, level was 156.6. PCP at that time ordered abdominal ultrasound done in Sept 2020, which was normal.  Lipase level most recently 67.8 on 1/13/22. January 2021 saw ALEXANDRIA Brown); work up included EGD and colonoscopy. Found with mild gastritis and erosive esophagitis, pandiverticulosis and multiple biopsies taken, all were negative. One polyp removed which was positive for tubular adenoma. She reduced caffeine and changed her diet, and the diarrhea improved for a short time around 2/23/21, but then returned. She has reduced gluten, which has helped some. She knows that she has some intolerance to dairy. Since cutting the dairy, she doesn't have the urgency to have BM's like she used to. But still goes up to 5 times per day. She is doing intermittent fasting, 17 hrs. She had a lot of allergies when she was born, but then as she got older she has not been allergic to anything, until she started she started with the loose stools. Also has some environmental allergies developed over the last couple of years. She smokes 1 ppd. Smoked from age [age], stopped for 23 years, then started again at age 44 when her son was killed in a car accident, and has been on and off ever since. She does not smoke in her house. She is now using vape pens now. She is motivated to quit. Review of Systems   Gastrointestinal: Positive for diarrhea. All other systems reviewed and are negative. Objective   Physical Exam  Vitals reviewed. Constitutional:       General: She is not in acute distress. Appearance: She is well-developed. HENT:      Head: Normocephalic. Right Ear: External ear normal.      Left Ear: External ear normal.      Nose: Nose normal.      Mouth/Throat:      Pharynx: No oropharyngeal exudate.    Eyes:      Conjunctiva/sclera: Conjunctivae normal.      Pupils: Pupils are equal, round, and reactive to light. Neck:      Thyroid: No thyromegaly. Cardiovascular:      Rate and Rhythm: Normal rate and regular rhythm. Heart sounds: Normal heart sounds. No murmur heard. No friction rub. No gallop. Pulmonary:      Effort: Pulmonary effort is normal. No respiratory distress. Breath sounds: Normal breath sounds. No wheezing or rales. Abdominal:      General: There is no distension. Palpations: Abdomen is soft. There is no mass. Tenderness: There is no abdominal tenderness. There is no guarding. Musculoskeletal:         General: Normal range of motion. Cervical back: Normal range of motion and neck supple. Lymphadenopathy:      Cervical: No cervical adenopathy. Skin:     General: Skin is warm and dry. Capillary Refill: Capillary refill takes less than 2 seconds. Neurological:      Mental Status: She is alert and oriented to person, place, and time. Psychiatric:         Behavior: Behavior normal.         Thought Content: Thought content normal.         Judgment: Judgment normal.                  An electronic signature was used to authenticate this note.     --PRITESH Jimenez - CNP

## 2022-03-31 NOTE — LETTER
[address] [place] [address] [address]  Phone: [phone]  Fax: PRITESH Nielsen CNP        April 4, 2022     Patient: Shireen Bill   YOB: 1965           To Whom it May Concern:    Please allow patient to use fitness facilities, including the pool, up to 7 days/week, to aid in strength training and weight loss for health. Current BMI 38. Feel free to contact my office with any questions or concerns.     Sincerely,       PRITESH Hernandez CNP

## 2022-03-31 NOTE — PATIENT INSTRUCTIONS
Nicotine patch 21 mg/24 hr for 6 weeks and follow up. Refer to allergist, they will call to schedule   Fating lab work, can wait until you see allergist to get all labs at once.

## 2022-04-01 ENCOUNTER — TELEPHONE (OUTPATIENT)
Dept: FAMILY MEDICINE CLINIC | Age: 57
End: 2022-04-01

## 2022-04-01 DIAGNOSIS — K52.9 CHRONIC DIARRHEA: Primary | ICD-10-CM

## 2022-04-01 NOTE — TELEPHONE ENCOUNTER
Please copy and paste into letter and notify patient she can pick it up. To whom it may concern,    Please allow patient to use fitness facilities, including the pool, up to 7 days/week, to aid in strength training and weight loss for health. Current BMI 38. Feel free to contact my office with any questions or concerns.     Celio Carlson, CNP

## 2022-04-01 NOTE — TELEPHONE ENCOUNTER
Patient was seen yesterday by Rhona Martini CNP at the [facility] location. A referral was placed for [facility]. Patient states they are scheduling into August 2022. Patient requesting to find another provider who could get her in sooner. Advised patient she may need to contact Valeri Fields directly for a list of providers in her network. Will contact a few offices to see where their scheduling.

## 2022-04-04 ENCOUNTER — TELEPHONE (OUTPATIENT)
Dept: FAMILY MEDICINE CLINIC | Age: 57
End: 2022-04-04

## 2022-04-04 NOTE — TELEPHONE ENCOUNTER
----- Message from Varun Richardson sent at 4/4/2022 12:51 PM EDT -----  Subject: Message to Provider    QUESTIONS  Information for Provider? Pt is requesting adipic for weight loss .   ---------------------------------------------------------------------------  --------------  CALL BACK INFO  What is the best way for the office to contact you?  OK to leave message on   voicemail  Preferred Call Back Phone Number? [phone]  ---------------------------------------------------------------------------  --------------  SCRIPT ANSWERS  undefined

## 2022-04-05 ENCOUNTER — NURSE ONLY (OUTPATIENT)
Dept: LAB | Age: 57
End: 2022-04-05

## 2022-04-05 DIAGNOSIS — Z13.220 SCREENING CHOLESTEROL LEVEL: ICD-10-CM

## 2022-04-05 DIAGNOSIS — K52.9 CHRONIC DIARRHEA: ICD-10-CM

## 2022-04-05 DIAGNOSIS — Z11.4 SCREENING FOR HUMAN IMMUNODEFICIENCY VIRUS WITHOUT PRESENCE OF RISK FACTORS: ICD-10-CM

## 2022-04-05 DIAGNOSIS — Z11.59 ENCOUNTER FOR HEPATITIS C SCREENING TEST FOR LOW RISK PATIENT: ICD-10-CM

## 2022-04-05 LAB
ALBUMIN SERPL-MCNC: 4.2 G/DL (ref 3.5–5.1)
ALP BLD-CCNC: 104 U/L (ref 38–126)
ALT SERPL-CCNC: 39 U/L (ref 11–66)
ANION GAP SERPL CALCULATED.3IONS-SCNC: 11 MEQ/L (ref 8–16)
AST SERPL-CCNC: 22 U/L (ref 5–40)
BACTERIA: NORMAL
BASOPHILS # BLD: 1.1 %
BASOPHILS ABSOLUTE: 0.1 THOU/MM3 (ref 0–0.1)
BILIRUB SERPL-MCNC: 0.2 MG/DL (ref 0.3–1.2)
BILIRUBIN URINE: NEGATIVE
BLOOD, URINE: NEGATIVE
BUN BLDV-MCNC: 15 MG/DL (ref 7–22)
CALCIUM SERPL-MCNC: 9.4 MG/DL (ref 8.5–10.5)
CASTS: NORMAL /LPF
CASTS: NORMAL /LPF
CHARACTER, URINE: CLEAR
CHLORIDE BLD-SCNC: 103 MEQ/L (ref 98–111)
CHOLESTEROL, FASTING: 246 MG/DL (ref 100–199)
CO2: 26 MEQ/L (ref 23–33)
COLOR: YELLOW
CREAT SERPL-MCNC: 0.8 MG/DL (ref 0.4–1.2)
CRYSTALS: NORMAL
EOSINOPHIL # BLD: 3 %
EOSINOPHILS ABSOLUTE: 0.2 THOU/MM3 (ref 0–0.4)
EPITHELIAL CELLS, UA: NORMAL /HPF
ERYTHROCYTE [DISTWIDTH] IN BLOOD BY AUTOMATED COUNT: 12.5 % (ref 11.5–14.5)
ERYTHROCYTE [DISTWIDTH] IN BLOOD BY AUTOMATED COUNT: 44.7 FL (ref 35–45)
GFR SERPL CREATININE-BSD FRML MDRD: 74 ML/MIN/1.73M2
GLUCOSE BLD-MCNC: 103 MG/DL (ref 70–108)
GLUCOSE, URINE: NEGATIVE MG/DL
HCT VFR BLD CALC: 45.1 % (ref 37–47)
HDLC SERPL-MCNC: 74 MG/DL
HEMOGLOBIN: 14.7 GM/DL (ref 12–16)
HEPATITIS C ANTIBODY: NEGATIVE
IMMATURE GRANS (ABS): 0.02 THOU/MM3 (ref 0–0.07)
IMMATURE GRANULOCYTES: 0.4 %
KETONES, URINE: NEGATIVE
LDL CHOLESTEROL CALCULATED: 152 MG/DL
LEUKOCYTE ESTERASE, URINE: NEGATIVE
LYMPHOCYTES # BLD: 31.5 %
LYMPHOCYTES ABSOLUTE: 1.8 THOU/MM3 (ref 1–4.8)
MCH RBC QN AUTO: 31.9 PG (ref 26–33)
MCHC RBC AUTO-ENTMCNC: 32.6 GM/DL (ref 32.2–35.5)
MCV RBC AUTO: 97.8 FL (ref 81–99)
MISCELLANEOUS LAB TEST RESULT: NORMAL
MONOCYTES # BLD: 9.6 %
MONOCYTES ABSOLUTE: 0.5 THOU/MM3 (ref 0.4–1.3)
NITRITE, URINE: NEGATIVE
NUCLEATED RED BLOOD CELLS: 0 /100 WBC
PH UA: 7.5 (ref 5–9)
PLATELET # BLD: 288 THOU/MM3 (ref 130–400)
PMV BLD AUTO: 9.2 FL (ref 9.4–12.4)
POTASSIUM SERPL-SCNC: 4.2 MEQ/L (ref 3.5–5.2)
PROTEIN UA: NEGATIVE MG/DL
RBC # BLD: 4.61 MILL/MM3 (ref 4.2–5.4)
RBC URINE: NORMAL /HPF
RENAL EPITHELIAL, UA: NORMAL
SEG NEUTROPHILS: 54.4 %
SEGMENTED NEUTROPHILS ABSOLUTE COUNT: 3.1 THOU/MM3 (ref 1.8–7.7)
SODIUM BLD-SCNC: 140 MEQ/L (ref 135–145)
SPECIFIC GRAVITY UA: < 1.005 (ref 1–1.03)
TOTAL PROTEIN: 7 G/DL (ref 6.1–8)
TRIGLYCERIDE, FASTING: 99 MG/DL (ref 0–199)
UROBILINOGEN, URINE: 0.2 EU/DL (ref 0–1)
WBC # BLD: 5.7 THOU/MM3 (ref 4.8–10.8)
WBC UA: NORMAL /HPF
YEAST: NORMAL

## 2022-04-06 LAB — HIV AG/AB: NONREACTIVE

## 2022-04-07 ENCOUNTER — OFFICE VISIT (OUTPATIENT)
Dept: FAMILY MEDICINE CLINIC | Age: 57
End: 2022-04-07
Payer: COMMERCIAL

## 2022-04-07 VITALS
BODY MASS INDEX: 36.29 KG/M2 | WEIGHT: 197.2 LBS | HEIGHT: 62 IN | HEART RATE: 85 BPM | SYSTOLIC BLOOD PRESSURE: 136 MMHG | DIASTOLIC BLOOD PRESSURE: 76 MMHG

## 2022-04-07 DIAGNOSIS — K52.9 CHRONIC DIARRHEA: Primary | ICD-10-CM

## 2022-04-07 DIAGNOSIS — R74.8 ELEVATED LIPASE: ICD-10-CM

## 2022-04-07 PROCEDURE — 3017F COLORECTAL CA SCREEN DOC REV: CPT | Performed by: NURSE PRACTITIONER

## 2022-04-07 PROCEDURE — 99215 OFFICE O/P EST HI 40 MIN: CPT | Performed by: NURSE PRACTITIONER

## 2022-04-07 PROCEDURE — G8427 DOCREV CUR MEDS BY ELIG CLIN: HCPCS | Performed by: NURSE PRACTITIONER

## 2022-04-07 PROCEDURE — G8417 CALC BMI ABV UP PARAM F/U: HCPCS | Performed by: NURSE PRACTITIONER

## 2022-04-07 PROCEDURE — 4004F PT TOBACCO SCREEN RCVD TLK: CPT | Performed by: NURSE PRACTITIONER

## 2022-04-07 ASSESSMENT — ENCOUNTER SYMPTOMS
ABDOMINAL PAIN: 0
DIARRHEA: 1
VOMITING: 0
NAUSEA: 0

## 2022-04-07 NOTE — PROGRESS NOTES
Nicolas Poe (:  1965) is a 55 y.o. female,Established patient, here for evaluation of the following chief complaint(s):  Weight Loss (discuss wegovy ) and Discuss Labs         ASSESSMENT/PLAN:  1. Chronic diarrhea  2. Elevated lipase  3. BMI 36.0-36.9,adult      Follow up with GI related to diarrhea and elevated lipase. Discussed risk vs benefit of Wegovy. Keep visit with allergist   Consider stopping supplements and then re-introducing slowly to evaluate for cause of diarrhea. Letter for use of [employer] fitness facilities. Return in about 6 weeks (around 2022). Subjective   SUBJECTIVE/OBJECTIVE:  HPI    Referred to allergist in [facility], but couldn't get in until Aug. Does have appt with another local allergist outside of [facility]. Wants to be tested for food allergies due to chronic diarrhea (loose, not watery, not excessive amounts, no blood/mucous). Followed by ALEXANDRIA Canseco), workup has been negative for etiology, other than chronically elevated Lipase. Tubular adenoma and diverticulosis per colonscopy in 2021, EGD showed mild gastritis, reflux,  and grade II erosive esophagitis. Abdominal US in 2020 was normal.     Labs discussed. GFR was 74, was 87 in 2020. No excessive use of NSAIDs or obvious nephrotoxic medications. BUN/Cr WNL. Father had Multiple Myeloma and developed kidney failure, so she is concerned. Interested in in [place]. BMI 36. According to the chart she has lost 14 lbs since her last appt 3/31. No personal of personal or FH of medullar thyroid cancer. She has chronic diarrhea as mentioned above. Screening negative for diarrhea. She does intermittent fasting. Has requested a letter to utilize the fitness facilities at a  Local hotel so she can start exercising. Review of Systems   Gastrointestinal: Positive for diarrhea. Negative for abdominal pain, nausea and vomiting. All other systems reviewed and are negative.          Objective Physical Exam  Vitals reviewed. Constitutional:       General: She is not in acute distress. Appearance: She is well-developed. HENT:      Head: Normocephalic. Right Ear: External ear normal.      Left Ear: External ear normal.      Nose: Nose normal.      Mouth/Throat:      Pharynx: No oropharyngeal exudate. Eyes:      Conjunctiva/sclera: Conjunctivae normal.      Pupils: Pupils are equal, round, and reactive to light. Neck:      Thyroid: No thyromegaly. Cardiovascular:      Rate and Rhythm: Normal rate and regular rhythm. Heart sounds: Normal heart sounds. No murmur heard. No friction rub. No gallop. Pulmonary:      Effort: Pulmonary effort is normal. No respiratory distress. Breath sounds: Normal breath sounds. No wheezing or rales. Abdominal:      General: There is no distension. Musculoskeletal:         General: Normal range of motion. Cervical back: Normal range of motion and neck supple. Lymphadenopathy:      Cervical: No cervical adenopathy. Skin:     General: Skin is warm and dry. Capillary Refill: Capillary refill takes less than 2 seconds. Neurological:      Mental Status: She is alert and oriented to person, place, and time. Psychiatric:         Behavior: Behavior normal.         Thought Content: Thought content normal.         Judgment: Judgment normal.            On this date 4/7/2022 I have spent 45 minutes reviewing previous notes, test results and face to face with the patient discussing the diagnosis and importance of compliance with the treatment plan as well as documenting on the day of the visit. An electronic signature was used to authenticate this note.     --Billy Higginbotham, PRITESH - CNP

## 2022-04-07 NOTE — PATIENT INSTRUCTIONS
Follow up with GI related to diarrhea and elevated lipase. Discussed risk vs benefit of Wegovy. Will hold off on ordering until seen again by GI. Keep visit with allergist   Consider stopping supplements and then re-introducing slowly to evaluate for cause of diarrhea. Letter for use of [employer] facilities. Patient Education        Learning About Low-Carbohydrate Diets  What is a low-carbohydrate diet? A low-carbohydrate (or \"low-carb\") diet limits foods and drinks that have carbohydrates. This includes grains, fruits, milk and yogurt, and starchy vegetables like potatoes, beans, and corn. It also avoids foods and drinks that have added sugar. Instead, low-carb diets include foods that are high inprotein and fat. Why might you follow a low-carb diet? Low-carb diets may be used for a variety of reasons, such as for weight loss. People who have diabetes may use a low-carb diet to help manage their bloodsugar levels. What should you do before you start the diet? Talk to your doctor before you try any diet. This is even more important if you have health problems like kidney disease, heart disease, or diabetes. Your doctor may suggest that you meet with a registered dietitian. A dietitian canhelp you make an eating plan that works for you. What foods do you eat on a low-carb diet? On a low-carb diet, you choose foods that are high in protein and fat. Examplesof these are:  [employer], poultry, and fish.  Eggs.  Nuts, such as walnuts, pecans, almonds, and peanuts.  Peanut butter and other nut butters.  Tofu.  Avocado.  Olives.  Non-starchy vegetables like broccoli, cauliflower, green beans, mushrooms, peppers, lettuce, and spinach.  Unsweetened non-dairy milks like almond milk and coconut milk.  Cheese, cottage cheese, and cream cheese. Where can you learn more? Go to https://avery.CartoDB. org and sign in to your SmartBIM account.  Enter C335 in the [facility] Information box to learn more about \"Learning About Low-Carbohydrate Diets. \"     If you do not have an account, please click on the \"Sign Up Now\" link. Current as of: September 8, 2021               Content Version: 13.2  © 6193-6312 CrowdProcess. Care instructions adapted under license by [employer]. If you have questions about a medical condition or this instruction, always ask your healthcare professional. [address] any warranty or liability for your use of this information. Patient Education        Learning About Carbohydrate (Carb) Counting and Eating Out When You Have Diabetes  Why plan your meals? Meal planning can be a key part of managing diabetes. Planning meals and snacks with the right balance of carbohydrate, protein, and fat can help you keep yourblood sugar at the target level you set with your doctor. You don't have to eat special foods. You can eat what your family eats, including sweets once in a while. But you do have to pay attention to how oftenyou eat and how much you eat of certain foods. You may want to work with a dietitian or a diabetes educator. They can give you tips and meal ideas and can answer your questions about meal planning. This health professional can also help you reach a healthy weight if that is one ofyour goals. What should you know about eating carbs? Managing the amount of carbohydrate (carbs) you eat is an important part ofhealthy meals when you have diabetes. Carbohydrate is found in many foods.  Learn which foods have carbs. And learn the amounts of carbs in different foods. ? Bread, cereal, pasta, and rice have about 15 grams of carbs in a serving. A serving is 1 slice of bread (1 ounce), ½ cup of cooked cereal, or 1/3 cup of cooked pasta or rice. ? Fruits have 15 grams of carbs in a serving.  A serving is 1 small fresh fruit, such as an apple or orange; ½ of a banana; ½ cup of cooked or canned fruit; ½ cup of fruit juice; 1 cup of melon or raspberries; or 2 tablespoons of dried fruit. ? Milk and no-sugar-added yogurt have 15 grams of carbs in a serving. A serving is 1 cup of milk or 3/4 cup (6 oz) of no-sugar-added yogurt. ? Starchy vegetables have 15 grams of carbs in a serving. A serving is ½ cup of mashed potatoes or sweet potato; 1 cup winter squash; ½ of a small baked potato; ½ cup of cooked beans; or ½ cup cooked corn or green peas.  Learn how much carbs to eat each day and at each meal. A dietitian or certified diabetes educator can teach you how to keep track of the amount of carbs you eat. This is called carbohydrate counting.  If you are not sure how to count carbohydrate grams, use the plate method to plan meals. It is a quick way to make sure that you have a balanced meal. It also can help you manage the amount of carbohydrate you eat at meals. ? Divide your plate by types of foods. Put non-starchy vegetables on half the plate, meat or other protein food on one-quarter of the plate, and a grain or starchy vegetable in the final quarter of the plate. To this you can add a small piece of fruit and 1 cup of milk or yogurt, depending on how many carbs you are supposed to eat at a meal.   Try to eat about the same amount of carbs at each meal. Do not \"save up\" your daily allowance of carbs to eat at one meal.   Proteins have very little or no carbs. Examples of proteins are beef, chicken, turkey, fish, eggs, tofu, cheese, cottage cheese, and peanut butter. How can you eat out and still eat healthy?  Learn to estimate the serving sizes of foods that have carbohydrate. If you measure food at home, it will be easier to estimate the amount in a serving of restaurant food.  If the meal you order has too much carbohydrate (such as potatoes, corn, or baked beans), ask to have a low-carbohydrate food instead. Ask for a salad or non-starchy vegetables like broccoli, cauliflower, green beans, or peppers.    If you eat more carbohydrate at a meal than you had planned, take a walk or do other exercise. This will help lower your blood sugar. What are some tips for eating healthy?  Limit saturated fat, such as the fat from meat and dairy products. This is a healthy choice because people who have diabetes are at higher risk of heart disease. So choose lean cuts of meat and nonfat or low-fat dairy products. Use olive or canola oil instead of butter or shortening when cooking.  Don't skip meals. Your blood sugar may drop too low if you skip meals and take insulin or certain medicines for diabetes.  Check with your doctor before you drink alcohol. Alcohol can cause your blood sugar to drop too low. Alcohol can also cause a bad reaction if you take certain diabetes medicines. Follow-up care is a key part of your treatment and safety. Be sure to make and go to all appointments, and call your doctor if you are having problems. It's also a good idea to know your test results and keep alist of the medicines you take. Where can you learn more? Go to https://Epidemic Sound.TaxiMe. org and sign in to your Factabase account. Enter J419 in the [facility] box to learn more about \"Learning About Carbohydrate (Carb) Counting and Eating Out When You Have Diabetes. \"     If you do not have an account, please click on the \"Sign Up Now\" link. Current as of: September 8, 2021               Content Version: 13.2  © 2006-2022 TV Pixie. Care instructions adapted under license by [facility] ([facility]). If you have questions about a medical condition or this instruction, always ask your healthcare professional. [place] [address] any warranty or liability for your use of this information. Patient Education        Gestational Diabetes Diet: Care Instructions  Overview     Gestational diabetes is a form of diabetes that can happen during pregnancy. It usually goes away after the baby is born. Gestational diabetes occurs when your body does not use insulin properly. Insulin helps sugar enter your cells, whereit is used for energy. You may be able to manage your blood sugar while you are pregnant by eating a healthy diet and getting regular exercise. A dietitian or diabetes educator can help you make a food plan. This plan will help manage your blood sugar andprovide good nutrition for you and your baby. If diet and exercise don't help keep your blood sugar in target range, you mayneed diabetes medicine or insulin. Follow-up care is a key part of your treatment and safety. Be sure to make and go to all appointments, and call your doctor if you are having problems. It's also a good idea to know your test results and keep alist of the medicines you take. How can you care for yourself at home?  Learn which foods have carbohydrate. Eating too much carbohydrate will cause your blood sugar to go too high. Carbohydrate foods include:  ? Breads, cereals, pasta, and rice. ? Dried beans and starchy vegetables, like corn, peas, and potatoes. ? Fruits and fruit juice, milk, and yogurt. ? Candy, table sugar, soda pop, and drinks sweetened with sugar.  Learn how much carbohydrate you need at meals and snacks. A dietitian or diabetes educator can teach you how to keep track of how much carbohydrate you eat.  Limit foods that have added sugar. This includes candy, desserts, and soda pop. These foods need to be counted as part of your total carbohydrate intake for the day.  Do not drink alcohol. Alcohol is not safe for you or your baby.  Do not skip meals. Your blood sugar may drop too low if you skip meals and use insulin.  Write down what you eat every day. Review your record with your dietitian or diabetes educator to see if you are eating the right amounts of foods.  Check your blood sugar first thing in the morning before you eat.  Then check your blood sugar 1 to 2 hours after the first bite of each meal (or as your doctor recommends). This will help you see how the food you eat affects your blood sugar. Keep track of these levels. Share the record with your doctor. When should you call for help? Watch closely for changes in your health, and be sure to contact your doctor if:     You have questions about your diet.      You often have problems with high or low blood sugar. Where can you learn more? Go to https://Wound Care Technologiespepiceweb.Directed Edge. org and sign in to your Plaxica account. Enter M291 in the [employer] box to learn more about \"Gestational Diabetes Diet: Care Instructions. \"     If you do not have an account, please click on the \"Sign Up Now\" link. Current as of: July 28, 2021               Content Version: 13.2  © 2006-2022 GoodAppetito. Care instructions adapted under license by [facility] ([facility]). If you have questions about a medical condition or this instruction, always ask your healthcare professional. [place] [address] any warranty or liability for your use of this information. Patient Education        Diabetic Renal Diet: Care Instructions  Overview     Your food choices are important when you have diabetes and kidney disease. Planning meals that have the right amount of carbohydrate, protein, and other nutrients can help keep your blood sugar in your target range and help yourkidneys work as well as possible. Your doctor and dietitian will help you make an eating plan. It will be based on your medical condition. You may need to limit salt, fluids, and protein. You also may need to limit minerals such as potassium and phosphorus. It takes planning, but there are plenty of tasty, healthy foods you can eat. Always talkwith your doctor or dietitian before you make changes in your diet. Follow-up care is a key part of your treatment and safety. Be sure to make and go to all appointments, and call your doctor if you are having problems.  It's also a good idea to know your test results and keep alist of the medicines you take. How can you care for yourself at home?  Work with your doctor or dietitian to create a food plan that guides your daily food choices.  Do not skip meals or go for many hours without eating.  Talk to your doctor or dietitian about ways to get more calories if you have a hard time eating enough.  If you would like to drink alcohol, ask your doctor if it's safe. To get the right amount of protein   Ask your doctor or dietitian how much protein you can have each day. You need some protein to stay healthy.  Include all sources of protein in your daily protein count. Besides meat, poultry, and fish, protein is found in milk and milk products, beans, peas, lentils, nuts, seeds, tofu, and eggs. Check for protein on the Nutrition Facts label found on packages of food such as bread and cereal.  To limit salt   Do not add salt to your food. And look for \"low sodium\" on labels.  Do not use a salt substitute or lite salt unless your doctor says it is okay. (These products are high in potassium.)   Avoid or use very small amounts of condiments and marinades. These include soy sauce, fish sauce, and barbecue sauce. They are high in sodium.  Avoid salted pretzels, chips, and other salted snacks.  Check food labels to become more aware of the sodium content of foods. Foods that are high in sodium include soups; many canned foods; cured, smoked, or dried meats; and many packaged foods. To control carbohydrate   Ask your dietitian how much carbohydrate you can have. Carbohydrate foods include:  ? Whole-grain and refined breads and cereals, and some vegetables such as peas and beans. ? Fruits, milk, and milk products (except cheese). ? Candy, table sugar, and regular carbonated drinks. To limit fluids   Know what your fluid allowance is. Fill a pitcher with that amount of water every day.  If you drink another fluid (such as coffee) that day, pour an equal amount out of the pitcher.  Foods that are liquid at room temperature count as fluids. These include ice, gelatin, ice pops, and ice cream.  To limit potassium   Limit foods that are high in potassium. Potassium is in many foods, including vegetables, fruits, and milk products. Some high-potassium foods are bananas, broccoli, cantaloupe, milk, oranges, potatoes, spinach, and tomatoes.  Choose fruits and vegetables that don't have as much potassium. These include applesauce, blueberries, cucumbers, grapes, green beans, lettuce, olives, pineapple, and raspberries. To limit phosphorus   Follow your doctor's or dietitian's plan for your limit on milk and milk products in your diet.  Avoid nuts, peanut butter, seeds, lentils, beans, organ meats, and sardines.  Avoid cola drinks.  Avoid bran breads or bran cereals. They are high in phosphorus. Where can you learn more? Go to https://Muchasa.CancerIQ. org and sign in to your [employer] account. Enter M074 in the [facility] box to learn more about \"Diabetic Renal Diet: Care Instructions. \"     If you do not have an account, please click on the \"Sign Up Now\" link. Current as of: July 28, 2021               Content Version: 13.2  © 2006-2022 Healthwise, Incorporated. Care instructions adapted under license by [facility] ([facility]). If you have questions about a medical condition or this instruction, always ask your healthcare professional. [place] [address] any warranty or liability for your use of this information. Patient Education        Learning About Meal Planning for Diabetes  Why plan your meals? Meal planning can be a key part of managing diabetes. Planning meals and snacks with the right balance of carbohydrate, protein, and fat can help you keep yourblood sugar at the target level you set with your doctor. You don't have to eat special foods.  You can eat what your family eats, including sweets once in a while. But you do have to pay attention to how oftenyou eat and how much you eat of certain foods. You may want to work with a dietitian or a diabetes educator. They can give you tips and meal ideas and can answer your questions about meal planning. This health professional can also help you reach a healthy weight if that is one ofyour goals. What plan is right for you? Your dietitian or diabetes educator may suggest that you start with the plateformat or carbohydrate counting. The plate format  The plate format is a simple way to help you manage how you eat. You plan meals by learning how much space each food should take on a plate. Using the plate format helps you manage the amount of carbohydrate you eat. It can make it easier to keep your blood sugar level within your target range. It also helpsyou see if you're eating healthy portion sizes. To use the plate format, you put non-starchy vegetables on half your plate. Add lean protein foods, such as fish, lean meats and poultry, or soy products, on one-quarter of the plate. Put a grain or starchy vegetable (such as brown rice or a potato) on the final quarter of the plate. You can add a small piece of fruit and some low-fat or fat-free milk or yogurt, depending on yourcarbohydrate goal for each meal.  Here are some tips for using the plate format:   Make sure that you are not using an oversized plate. A 9-inch plate is best. Many restaurants use larger plates.  Get used to using the plate format at home. Then you can use it when you eat out.  Write down your questions about using the plate format. Talk to your doctor, a dietitian, or a diabetes educator about your concerns. Carbohydrate counting  With carbohydrate counting, you plan meals based on the amount of carbohydrate in each food. Carbohydrate raises blood sugar higher and more quickly than any other nutrient.  It is found in desserts, breads and cereals, and fruit. It's also found in starchy vegetables such as potatoes and corn, grains such as rice and pasta, and milk and yogurt. You can help keep your blood sugar levels within your target range by planning how much carbohydrate to have at meals andsnacks. The amount you need depends on several things. These include your weight, how active you are, which diabetes medicines you take, and what your goals are for your blood sugar levels. A registered dietitian or diabetes educator can helpyou plan how much carbohydrate to include in each meal and snack. An example of a carbohydrate counting plan is:   45 to 60 grams at each meal. That's about the same as 3 to 4 carbohydrate servings.  15 to 20 grams at each snack. That's about the same as 1 carbohydrate serving. The Nutrition Facts label on packaged foods tells you how much carbohydrate is in a serving of the food. First, look at the serving size on the food label. Is that the amount you eat in a serving? All of the nutrition information on a food label is based on that serving size. So if you eat more or less than that, you'll need to adjust the other numbers. Total carbohydrate is the next thing you need to look for on the label. If you count carbohydrate servings, oneserving of carbohydrate is 15 grams. For foods that don't come with labels, such as fresh fruits and vegetables, you'll need a guide that lists carbohydrate in these foods. Ask your doctor, dietitian, or diabetes educator about books or other nutrition guides you canuse. If you take insulin, you need to know how many grams of carbohydrate are in a meal. This lets you know how much rapid-acting insulin to take before you eat. If you use an insulin pump, you get a constant rate of insulin during the day. So the pump must be programmed at meals to give you extra insulin to cover therise in blood sugar after meals.   When you know how much carbohydrate you will eat, you can take the right amount of insulin. Or, if you always use the same amount of insulin, you need to [facility] that you eat the same amount of carbohydrate at meals. If you need more help to understand carbohydrate counting and food labels, askyour doctor, dietitian, or diabetes educator. How can you plan healthy meals? Here are some tips to get started:  [facility] your meals a week at a time. Don't forget to include snacks too.  Use cookbooks or online recipes to plan several main meals. Plan some quick meals for busy nights. You also can double some recipes that freeze well. Then you can save half for other busy nights when you don't have time to cook.  Make sure you have the ingredients you need for your recipes. If you're running low on basic items, put these items on your shopping list too.  List foods that you use to make breakfasts, lunches, and snacks. List plenty of fruits and vegetables.  Post this list on the refrigerator. Add to it as you think of more things you need.  Take the list to the store to do your weekly shopping. Follow-up care is a key part of your treatment and safety. Be sure to make and go to all appointments, and call your doctor if you are having problems. It's also a good idea to know your test results and keep alist of the medicines you take. Where can you learn more? Go to https://3VRpesiriewcristofer.Peacock Parade. org and sign in to your Glycosan account. Enter E911 in the [facility] box to learn more about \"Learning About Meal Planning for Diabetes. \"     If you do not have an account, please click on the \"Sign Up Now\" link. Current as of: September 8, 2021               Content Version: 13.2  © 6189-1396 Healthwise, Incorporated. Care instructions adapted under license by [facility] ([facility]). If you have questions about a medical condition or this instruction, always ask your healthcare professional. [address] any warranty or liability for your use of this information.

## 2022-04-18 ENCOUNTER — NURSE ONLY (OUTPATIENT)
Dept: LAB | Age: 57
End: 2022-04-18

## 2022-04-18 DIAGNOSIS — K21.9 GASTROESOPHAGEAL REFLUX DISEASE, UNSPECIFIED WHETHER ESOPHAGITIS PRESENT: ICD-10-CM

## 2022-04-18 DIAGNOSIS — K52.9 CHRONIC DIARRHEA: ICD-10-CM

## 2022-04-18 DIAGNOSIS — R74.8 ELEVATED LIPASE: ICD-10-CM

## 2022-04-18 LAB
ADENOVIRUS F 40 41 PCR: NOT DETECTED
ALBUMIN SERPL-MCNC: 4.5 G/DL (ref 3.5–5.1)
ALP BLD-CCNC: 117 U/L (ref 38–126)
ALT SERPL-CCNC: 17 U/L (ref 11–66)
AMYLASE: 66 U/L (ref 20–104)
ANION GAP SERPL CALCULATED.3IONS-SCNC: 14 MEQ/L (ref 8–16)
AST SERPL-CCNC: 16 U/L (ref 5–40)
ASTROVIRUS PCR: NOT DETECTED
BILIRUB SERPL-MCNC: < 0.2 MG/DL (ref 0.3–1.2)
BUN BLDV-MCNC: 17 MG/DL (ref 7–22)
CALCIUM SERPL-MCNC: 10 MG/DL (ref 8.5–10.5)
CAMPYLOBACTER PCR: NOT DETECTED
CHLORIDE BLD-SCNC: 103 MEQ/L (ref 98–111)
CLOSTRIDIUM DIFFICILE, PCR: NOT DETECTED
CO2: 27 MEQ/L (ref 23–33)
CREAT SERPL-MCNC: 0.9 MG/DL (ref 0.4–1.2)
CRYPTOSPORIDIUM PCR: NOT DETECTED
CYCLOSPORA CAYETANENSIS PCR: NOT DETECTED
E COLI 0157 PCR: NORMAL
E COLI ENTEROAGGREGATIVE PCR: NOT DETECTED
E COLI ENTEROPATHOGENIC PCR: NOT DETECTED
E COLI ENTEROTOXIGENIC PCR: NOT DETECTED
E COLI SHIGA LIKE TOXIN PCR: NOT DETECTED
E COLI SHIGELLA/ENTEROINVASIVE PCR: NOT DETECTED
E HISTOLYTICA GI FILM ARRAY: NOT DETECTED
ERYTHROCYTE [DISTWIDTH] IN BLOOD BY AUTOMATED COUNT: 12.7 % (ref 11.5–14.5)
ERYTHROCYTE [DISTWIDTH] IN BLOOD BY AUTOMATED COUNT: 46.7 FL (ref 35–45)
GAMMA GLUTAMYL TRANSFERASE: 19 U/L (ref 8–69)
GFR SERPL CREATININE-BSD FRML MDRD: 65 ML/MIN/1.73M2
GIARDIA LAMBLIA PCR: NOT DETECTED
GLUCOSE BLD-MCNC: 92 MG/DL (ref 70–108)
HCT VFR BLD CALC: 47.2 % (ref 37–47)
HEMOGLOBIN: 14.9 GM/DL (ref 12–16)
LIPASE: 66.6 U/L (ref 5.6–51.3)
MCH RBC QN AUTO: 31.4 PG (ref 26–33)
MCHC RBC AUTO-ENTMCNC: 31.6 GM/DL (ref 32.2–35.5)
MCV RBC AUTO: 99.6 FL (ref 81–99)
NOROVIRUS GI GII PCR: NOT DETECTED
PLATELET # BLD: 273 THOU/MM3 (ref 130–400)
PLESIOMONAS SHIGELLOIDES PCR: NOT DETECTED
PMV BLD AUTO: 9.7 FL (ref 9.4–12.4)
POTASSIUM SERPL-SCNC: 3.9 MEQ/L (ref 3.5–5.2)
RBC # BLD: 4.74 MILL/MM3 (ref 4.2–5.4)
ROTAVIRUS A PCR: NOT DETECTED
SALMONELLA PCR: NOT DETECTED
SAPOVIRUS PCR: NOT DETECTED
SODIUM BLD-SCNC: 144 MEQ/L (ref 135–145)
TOTAL PROTEIN: 6.7 G/DL (ref 6.1–8)
VIBRIO CHOLERAE PCR: NOT DETECTED
VIBRIO PCR: NOT DETECTED
WBC # BLD: 7.2 THOU/MM3 (ref 4.8–10.8)
YERSINIA ENTEROCOLITICA PCR: NOT DETECTED

## 2022-04-19 LAB — CA 19-9: 18 U/ML (ref 0–35)

## 2022-04-20 LAB
ALLERGEN BARLEY IGE: < 0.1 KU/L
ALLERGEN BEEF: < 0.1 KU/L
ALLERGEN CABBAGE IGE: < 0.1 KU/L
ALLERGEN CARROT IGE: < 0.1 KU/L
ALLERGEN CHICKEN IGE: < 0.1 KU/L
ALLERGEN CODFISH IGE: < 0.1 KU/L
ALLERGEN CORN IGE: < 0.1 KU/L
ALLERGEN COW MILK IGE: < 0.1 KU/L
ALLERGEN CRAB IGE: < 0.1 KU/L
ALLERGEN EGG WHITE IGE: < 0.1 KU/L
ALLERGEN GRAPE IGE: < 0.1 KU/L
ALLERGEN LETTUCE IGE: < 0.1 KU/L
ALLERGEN NAVY BEAN: < 0.1 KU/L
ALLERGEN OAT: < 0.1 KU/L
ALLERGEN ORANGE IGE: < 0.1 KU/L
ALLERGEN PEANUT (F13) IGE: < 0.1 KU/L
ALLERGEN PEPPER C. ANNUUM IGE: < 0.1 KU/L
ALLERGEN PORK: < 0.1 KU/L
ALLERGEN RICE IGE: < 0.1 KU/L
ALLERGEN RYE IGE: 0.17 KU/L
ALLERGEN SOYBEAN IGE: < 0.1 KU/L
ALLERGEN TOMATO IGE: < 0.1 KU/L
ALLERGEN TUNA IGE: < 0.1 KU/L
ALLERGEN WHEAT IGE: < 0.1 KU/L
CELIAC SEROLOGY: NORMAL
IGE: 39 IU/ML
POTATO, IGE: < 0.1 KU/L
SHRIMP: < 0.1 KU/L
TISSUE TRANSGLUTAMINASE IGA: 0.2 U/ML

## 2022-04-22 LAB
CALPROTECTIN: 6 UG/G
PANCREATIC ELASTASE, FECAL: > 800 UG/G

## 2022-04-27 ENCOUNTER — OFFICE VISIT (OUTPATIENT)
Dept: FAMILY MEDICINE CLINIC | Age: 57
End: 2022-04-27
Payer: COMMERCIAL

## 2022-04-27 VITALS
SYSTOLIC BLOOD PRESSURE: 136 MMHG | BODY MASS INDEX: 35.65 KG/M2 | HEIGHT: 63 IN | HEART RATE: 98 BPM | DIASTOLIC BLOOD PRESSURE: 69 MMHG | WEIGHT: 201.2 LBS

## 2022-04-27 DIAGNOSIS — R94.4 DECREASED GFR: Primary | ICD-10-CM

## 2022-04-27 DIAGNOSIS — K52.9 CHRONIC DIARRHEA: ICD-10-CM

## 2022-04-27 DIAGNOSIS — R74.8 ELEVATED LIPASE: ICD-10-CM

## 2022-04-27 PROCEDURE — 3017F COLORECTAL CA SCREEN DOC REV: CPT | Performed by: NURSE PRACTITIONER

## 2022-04-27 PROCEDURE — 4004F PT TOBACCO SCREEN RCVD TLK: CPT | Performed by: NURSE PRACTITIONER

## 2022-04-27 PROCEDURE — 99214 OFFICE O/P EST MOD 30 MIN: CPT | Performed by: NURSE PRACTITIONER

## 2022-04-27 PROCEDURE — G8417 CALC BMI ABV UP PARAM F/U: HCPCS | Performed by: NURSE PRACTITIONER

## 2022-04-27 PROCEDURE — G8427 DOCREV CUR MEDS BY ELIG CLIN: HCPCS | Performed by: NURSE PRACTITIONER

## 2022-04-27 RX ORDER — FLUTICASONE PROPIONATE 50 MCG
SPRAY, SUSPENSION (ML) NASAL
COMMUNITY
Start: 2022-04-18 | End: 2022-05-17

## 2022-04-27 NOTE — PROGRESS NOTES
Kathy Haley (:  1965) is a 55 y.o. female,Established patient, here for evaluation of the following chief complaint(s):  Discuss Labs         ASSESSMENT/PLAN:  1. Decreased GFR  -     Protein / Creatinine Ratio, Urine; Future  -     Urinalysis with Microscopic; Future  -     Basic Metabolic Panel; Future  2. Chronic diarrhea  3. Elevated lipase    Discussion with patient regarding indications for referral to nephrology. Discussed that GFR  can vary from day-to-day, based on hydration status, and is best used as a trending number over time, and reassured that her BUN/creatinine are within normal range. Advised on avoiding nephrotoxic substances  Encourage adequate hydration  Will repeat some blood work and urine in a couple of weeks, and if GFR continues to trend down, patient would like to be referred to nephrology for further work-up. Discussed being patient with the weight loss process, consistency is key. Further work-up for diarrhea and elevated lipase per GI      No follow-ups on file. Subjective   SUBJECTIVE/OBJECTIVE:  HPI    GFR has dropped some over the past couple of years. Was 80 in 2020, 74 on 22, and 65 on 22. BUN and Creatinine 17/0.9 on 22. Urine test was normal on 22. She takes ASA for headache. Rarely uses NSAIDS. She takes multiple supplements, but not consistently- will get us a list.   Has some lower leg swelling, states diuretic from previous PCP was not effective. She was seen at the [facility] and had massages for Lymphedema. The swelling got better. She has support socks that she does not wear, and swelling has returned. Following with GI for chronic, mild lipase elevation, and chronic diarrhea. Has a CT of the abdomen pending. Celiac panel was negative. Allergy testing showed mild reaction to rye, significance indeterminate. She is concerned about the drop in her kidney function, especially over the last 2 weeks.   She is requesting to see a nephrologist for further work-up. She continues to be frustrated about inability to lose weight. Reports going to the gym the last several days, and yet has gained 4 pounds. Review of Systems   Gastrointestinal: Positive for diarrhea (Chronic). All other systems reviewed and are negative. Objective   Physical Exam  Vitals reviewed. Constitutional:       General: She is not in acute distress. Appearance: She is well-developed. HENT:      Head: Normocephalic. Right Ear: External ear normal.      Left Ear: External ear normal.      Nose: Nose normal.      Mouth/Throat:      Pharynx: No oropharyngeal exudate. Eyes:      Conjunctiva/sclera: Conjunctivae normal.      Pupils: Pupils are equal, round, and reactive to light. Neck:      Thyroid: No thyromegaly. Cardiovascular:      Rate and Rhythm: Normal rate and regular rhythm. Heart sounds: Normal heart sounds. No murmur heard. No friction rub. No gallop. Pulmonary:      Effort: Pulmonary effort is normal. No respiratory distress. Breath sounds: Normal breath sounds. No wheezing or rales. Musculoskeletal:         General: Normal range of motion. Cervical back: Normal range of motion and neck supple. Lymphadenopathy:      Cervical: No cervical adenopathy. Skin:     General: Skin is warm and dry. Capillary Refill: Capillary refill takes less than 2 seconds. Neurological:      Mental Status: She is alert and oriented to person, place, and time. Psychiatric:         Behavior: Behavior normal.         Thought Content: Thought content normal.         Judgment: Judgment normal.            On this date 4/27/2022 I have spent 30 minutes reviewing previous notes, test results and face to face with the patient discussing the diagnosis and importance of compliance with the treatment plan as well as documenting on the day of the visit.       An electronic signature was used to authenticate this note.    --Charlee Gil, APRN - CNP

## 2022-04-28 ASSESSMENT — ENCOUNTER SYMPTOMS: DIARRHEA: 1

## 2022-05-06 ENCOUNTER — NURSE ONLY (OUTPATIENT)
Dept: LAB | Age: 57
End: 2022-05-06

## 2022-05-06 DIAGNOSIS — R94.4 DECREASED GFR: ICD-10-CM

## 2022-05-06 LAB
ANION GAP SERPL CALCULATED.3IONS-SCNC: 13 MEQ/L (ref 8–16)
BACTERIA: NORMAL
BILIRUBIN URINE: NEGATIVE
BLOOD, URINE: NEGATIVE
BUN BLDV-MCNC: 20 MG/DL (ref 7–22)
CALCIUM SERPL-MCNC: 9 MG/DL (ref 8.5–10.5)
CASTS: NORMAL /LPF
CASTS: NORMAL /LPF
CHARACTER, URINE: CLEAR
CHLORIDE BLD-SCNC: 103 MEQ/L (ref 98–111)
CO2: 26 MEQ/L (ref 23–33)
COLOR: YELLOW
CREAT SERPL-MCNC: 0.9 MG/DL (ref 0.4–1.2)
CREATININE URINE: 91.8 MG/DL
CRYSTALS: NORMAL
EPITHELIAL CELLS, UA: NORMAL /HPF
GFR SERPL CREATININE-BSD FRML MDRD: 65 ML/MIN/1.73M2
GLUCOSE BLD-MCNC: 106 MG/DL (ref 70–108)
GLUCOSE, URINE: NEGATIVE MG/DL
KETONES, URINE: NEGATIVE
LEUKOCYTE ESTERASE, URINE: NEGATIVE
MISCELLANEOUS LAB TEST RESULT: NORMAL
NITRITE, URINE: NEGATIVE
PH UA: 5.5 (ref 5–9)
POTASSIUM SERPL-SCNC: 4.2 MEQ/L (ref 3.5–5.2)
PROT/CREAT RATIO, UR: 0.06
PROTEIN UA: NEGATIVE MG/DL
PROTEIN, URINE: 5.7 MG/DL
RBC URINE: NORMAL /HPF
RENAL EPITHELIAL, UA: NORMAL
SODIUM BLD-SCNC: 142 MEQ/L (ref 135–145)
SPECIFIC GRAVITY UA: 1.02 (ref 1–1.03)
UROBILINOGEN, URINE: 0.2 EU/DL (ref 0–1)
WBC UA: NORMAL /HPF
YEAST: NORMAL

## 2022-05-09 ENCOUNTER — TELEPHONE (OUTPATIENT)
Dept: FAMILY MEDICINE CLINIC | Age: 57
End: 2022-05-09

## 2022-05-09 DIAGNOSIS — N18.2 CKD (CHRONIC KIDNEY DISEASE) STAGE 2, GFR 60-89 ML/MIN: Primary | ICD-10-CM

## 2022-05-17 ENCOUNTER — OFFICE VISIT (OUTPATIENT)
Dept: FAMILY MEDICINE CLINIC | Age: 57
End: 2022-05-17
Payer: COMMERCIAL

## 2022-05-17 VITALS
SYSTOLIC BLOOD PRESSURE: 133 MMHG | HEART RATE: 88 BPM | DIASTOLIC BLOOD PRESSURE: 83 MMHG | WEIGHT: 201.2 LBS | HEIGHT: 63 IN | BODY MASS INDEX: 35.65 KG/M2

## 2022-05-17 DIAGNOSIS — N18.2 CKD (CHRONIC KIDNEY DISEASE) STAGE 2, GFR 60-89 ML/MIN: ICD-10-CM

## 2022-05-17 DIAGNOSIS — K52.9 CHRONIC DIARRHEA: ICD-10-CM

## 2022-05-17 DIAGNOSIS — Z78.9 PATIENT REQUESTS ALTERNATE TREATMENT: ICD-10-CM

## 2022-05-17 DIAGNOSIS — F17.200 CURRENT EVERY DAY SMOKER: Primary | ICD-10-CM

## 2022-05-17 PROCEDURE — 99214 OFFICE O/P EST MOD 30 MIN: CPT | Performed by: NURSE PRACTITIONER

## 2022-05-17 PROCEDURE — G8427 DOCREV CUR MEDS BY ELIG CLIN: HCPCS | Performed by: NURSE PRACTITIONER

## 2022-05-17 PROCEDURE — 4004F PT TOBACCO SCREEN RCVD TLK: CPT | Performed by: NURSE PRACTITIONER

## 2022-05-17 PROCEDURE — 3017F COLORECTAL CA SCREEN DOC REV: CPT | Performed by: NURSE PRACTITIONER

## 2022-05-17 PROCEDURE — G8417 CALC BMI ABV UP PARAM F/U: HCPCS | Performed by: NURSE PRACTITIONER

## 2022-05-17 NOTE — PROGRESS NOTES
Amelia Lao (:  1965) is a 55 y.o. female,Established patient, here for evaluation of the following chief complaint(s):  Follow-up         ASSESSMENT/PLAN:  1. Current every day smoker  -     nicotine (NICOTROL) 10 MG inhaler; Inhale 1 puff into the lungs as needed for Smoking cessation, Disp-30 each, R-3Normal  2. CKD (chronic kidney disease) stage 2, GFR 60-89 ml/min  -     External Referral To Nephrology  3. Patient requests alternate treatment  -     External Referral To Nephrology  4. Chronic diarrhea    Rescheduled CT abdomen. Will be safe with oral contrast.   Will refer to Dr Ari Wilson, as requested. Nicotrol inhaler for smoking cessation. Compression socks daily for lower leg swelling. Return in about 6 weeks (around 2022) for smoking. Subjective   SUBJECTIVE/OBJECTIVE:  HPI    Mildly decreased GFR: Had a slight decline in GFR from April to May, patient has become quite concerned since then, and despite reassurance, has requested a referral to a nephrologist at [facility]; Dr Kristina Ortiz. She has also requested a referral to a CKD dietitian, and has inquired about the use of Cirilo Eng for chronic kidney disease. She was advised that her GFR is not <60, thus Cirilo Eng would not be indicated for her. She was advised to research renal diets via reputable academic sources such as [facility] or [facility]. Previous GFR readings include:   87 on 20   74 on 22  65 on 22  65 on 22   Urinalysis was normal on 22, Urine Protein-Creatinine ratio 0.06. She does have a history of some bilateral lower leg swelling. The right is very slightly worse than the left. Originally seen by Dr. Yazmin Tenorio, and then Dr Wei Nicole. Previously given a diuretic, but states it did not help. Referred to [address] [place] [address]. She was diagnosed with Lymphedema and given compression socks, but doesn't wear them routinely.   States her legs are \"perfectly normal\" when she wakes up in the morning. But the longer she stands on them the more swollen they become. Smoker, 1 ppd x21 years, states she cannot keep the patches on. She puts them on after the shower, making sure her skin is dry without any lotion. She states they do not stay on it, she has even tried to take them to her skin. Refer to GI for chronic diarrhea. She had a CT of the abdomen scheduled for today, but was concerned about the oral contrast affecting her kidneys so she called to cancel it. Her GI provider was made aware of the concerns, and advised the oral contrast would be okay      Review of Systems   Cardiovascular: Positive for leg swelling. Objective   Physical Exam  Vitals reviewed. Constitutional:       General: She is not in acute distress. Appearance: She is well-developed. HENT:      Head: Normocephalic. Right Ear: External ear normal.      Left Ear: External ear normal.      Nose: Nose normal.      Mouth/Throat:      Pharynx: No oropharyngeal exudate. Eyes:      Conjunctiva/sclera: Conjunctivae normal.      Pupils: Pupils are equal, round, and reactive to light. Neck:      Thyroid: No thyromegaly. Cardiovascular:      Rate and Rhythm: Normal rate and regular rhythm. Heart sounds: Normal heart sounds. No murmur heard. No friction rub. No gallop. Pulmonary:      Effort: Pulmonary effort is normal. No respiratory distress. Breath sounds: Normal breath sounds. No wheezing or rales. Abdominal:      General: There is no distension. Palpations: Abdomen is soft. There is no mass. Tenderness: There is no abdominal tenderness. There is no guarding. Musculoskeletal:         General: Normal range of motion. Cervical back: Normal range of motion and neck supple. Right lower leg: Edema present. Left lower leg: Edema present. Lymphadenopathy:      Cervical: No cervical adenopathy. Skin:     General: Skin is warm and dry.       Capillary Refill: Capillary refill takes less than 2 seconds. Neurological:      Mental Status: She is alert and oriented to person, place, and time. Psychiatric:         Behavior: Behavior normal.         Thought Content: Thought content normal.         Judgment: Judgment normal.                  An electronic signature was used to authenticate this note.     --PRITESH Fonseca - CNP

## 2022-05-17 NOTE — PATIENT INSTRUCTIONS
Rescheduled CT abdomen. Will be safe with oral contrast.   Will refer to Dr Henri Phillips, as requested. Compression socks daily for lower leg swelling.

## 2022-07-20 ENCOUNTER — NURSE ONLY (OUTPATIENT)
Dept: LAB | Age: 57
End: 2022-07-20

## 2022-07-20 DIAGNOSIS — N18.2 CKD (CHRONIC KIDNEY DISEASE) STAGE 2, GFR 60-89 ML/MIN: ICD-10-CM

## 2022-07-20 LAB
ANION GAP SERPL CALCULATED.3IONS-SCNC: 11 MEQ/L (ref 8–16)
BUN BLDV-MCNC: 30 MG/DL (ref 7–22)
CALCIUM SERPL-MCNC: 10.2 MG/DL (ref 8.5–10.5)
CHLORIDE BLD-SCNC: 106 MEQ/L (ref 98–111)
CO2: 24 MEQ/L (ref 23–33)
CREAT SERPL-MCNC: 1.2 MG/DL (ref 0.4–1.2)
GFR SERPL CREATININE-BSD FRML MDRD: 46 ML/MIN/1.73M2
GLUCOSE BLD-MCNC: 101 MG/DL (ref 70–108)
POTASSIUM SERPL-SCNC: 4.8 MEQ/L (ref 3.5–5.2)
SODIUM BLD-SCNC: 141 MEQ/L (ref 135–145)

## 2022-07-26 DIAGNOSIS — N18.31 STAGE 3A CHRONIC KIDNEY DISEASE (HCC): Primary | ICD-10-CM

## 2022-07-27 ENCOUNTER — NURSE ONLY (OUTPATIENT)
Dept: LAB | Age: 57
End: 2022-07-27

## 2022-07-27 DIAGNOSIS — N18.31 STAGE 3A CHRONIC KIDNEY DISEASE (HCC): ICD-10-CM

## 2022-07-27 LAB
CREATININE URINE: 23.1 MG/DL
PROT/CREAT RATIO, UR: 0.17
PROTEIN, URINE: < 4 MG/DL

## 2022-08-11 ENCOUNTER — HOSPITAL ENCOUNTER (OUTPATIENT)
Dept: ULTRASOUND IMAGING | Age: 57
Discharge: HOME | End: 2022-08-11
Payer: COMMERCIAL

## 2022-08-11 DIAGNOSIS — Z87.891 PERSONAL HISTORY OF TOBACCO USE, PRESENTING HAZARDS TO HEALTH: ICD-10-CM

## 2022-08-11 DIAGNOSIS — E66.9 LIFELONG OBESITY: ICD-10-CM

## 2022-08-11 DIAGNOSIS — E78.49 FAMILIAL COMBINED HYPERLIPIDEMIA: ICD-10-CM

## 2022-08-11 DIAGNOSIS — R94.4 DECREASED GFR: ICD-10-CM

## 2022-08-11 PROCEDURE — 76770 US EXAM ABDO BACK WALL COMP: CPT

## 2022-08-17 ENCOUNTER — PATIENT MESSAGE (OUTPATIENT)
Dept: FAMILY MEDICINE CLINIC | Age: 57
End: 2022-08-17

## 2022-08-17 NOTE — TELEPHONE ENCOUNTER
From: Rita Abarca  To: Mary Alvarez  Sent: 8/17/2022 11:54 AM EDT  Subject: Kidney ultrasound image     I keep getting an error and am not able to open the imaging of the ultrasound. Are you able to see it? Also, I would like to know what I'm able to do in order to empty my bladder completely.    Thank you   [employer] [facility] [facility]

## 2023-10-31 NOTE — TELEPHONE ENCOUNTER
Chief complaint:   Chief Complaint   Patient presents with   • Neck Pain     3       Vitals:  Visit Vitals  /77 (BP Location: LUE - Left upper extremity, Patient Position: Sitting)   Pulse 86   Temp 98.1 °F (36.7 °C) (Temporal)   Resp 15   Ht 5' 3\" (1.6 m)   Wt 90.2 kg (198 lb 13.7 oz)   LMP 10/24/2023 (Approximate)   SpO2 98%   BMI 35.23 kg/m²       HISTORY OF PRESENT ILLNESS     HPI  36-year-old female presents to urgent care with complain of neck pain x 8 days.    Patient states she woke up one day and her neck was hurting.  She 1st thought she slept wrong.  Pain has progressively gotten worse now.  Pain is pretty constant, sharp, 5 to 6/10, worse with prolonged sitting, laying down, no relieving factors pain is radiating from right side of neck to right upper shoulder/upper back.  She states she also feels headache on the same side. 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  She denies any particular injury.  She denies any fall, trauma fever, chills, chest pain, shortness of  breath, numbness, tingling, weakness of extremities, nausea, vomiting, bowel or bladder incontinence and dizziness.  Other significant problems:  Patient Active Problem List    Diagnosis Date Noted   • Depression 10/30/2019     Priority: Low   • Anxiety 10/30/2019     Priority: Low   • PTSD (post-traumatic stress disorder) 10/30/2019     Priority: Low   • Suicidal ideation 10/30/2019     Priority: Low   • ADHD (attention deficit hyperactivity disorder) 10/30/2019     Priority: Low   • Attention deficit disorder (ADD) without hyperactivity 01/30/2018     Priority: Low   • Social anxiety disorder 08/03/2016     Priority: Low   • Anxiety and depression 08/03/2016     Priority: Low       PAST MEDICAL, FAMILY AND SOCIAL HISTORY     Medications:  Current Outpatient Medications   Medication Sig Dispense Refill   • lisdexamfetamine (Vyvanse) 30 MG capsule Take 1 capsule by mouth every morning. 90 capsule 0   • methylPREDNISolone (MEDROL DOSEPAK) 4  Patient would like a letter to do water therapy at a local hotel for weight loss and strength training. To be able to use the pool and amenities at the hotel facility. Will  the note at the [facility] office, just need to call patient to notified when note is finished. MG tablet follow package directions 21 tablet 0   • diclofenac (VOLTAREN) 75 MG EC tablet Take 1 tablet by mouth in the morning and 1 tablet in the evening. 60 tablet 1   • cariprazine (VRAYLAR) 1.5 MG capsule Take 1 capsule by mouth daily. 90 capsule 3   • mirtazapine (REMERON) 30 MG tablet Take 1 tablet by mouth nightly. 90 tablet 3   • Valacyclovir HCl (Valtrex) 1000 MG Tab 2 tablets twice a day for 1 day as directed 30 tablet 2   • metFORMIN (GLUCOPHAGE-XR) 500 MG 24 hr tablet Take 500 mg by mouth daily.     • norethindrone-ethinyl estradiol-iron (Microgestin FE 1.5/30) 1.5-30 MG-MCG tablet Take 1 tablet by mouth daily. 30 tablet 11     No current facility-administered medications for this visit.       Allergies:  ALLERGIES:  No Known Allergies    Past Medical  History/Surgeries:  Past Medical History:   Diagnosis Date   • Anxiety    • Attention deficit disorder    • Depressive disorder        Past Surgical History:   Procedure Laterality Date   • Shoulder surgery         Family History:  Family History   Problem Relation Age of Onset   • Thyroid Mother    • Anxiety disorder Father        Social History:  Social History     Tobacco Use   • Smoking status: Never   • Smokeless tobacco: Never   Substance Use Topics   • Alcohol use: Yes     Alcohol/week: 0.0 standard drinks of alcohol     Comment: once very couple weeks       REVIEW OF SYSTEMS     Review of Systems   Constitutional: Negative for appetite change, fatigue and fever.   Respiratory: Negative for shortness of breath.    Cardiovascular: Negative for chest pain.   Gastrointestinal: Negative for abdominal pain, nausea and vomiting.   Musculoskeletal: Positive for back pain and neck pain. Negative for gait problem.   Neurological: Positive for headaches. Negative for dizziness, weakness and numbness.       PHYSICAL EXAM     Physical Exam  Vitals and nursing note reviewed.   Constitutional:       General: She is not in acute distress.     Appearance: Normal  appearance. She is well-developed. She is not ill-appearing or diaphoretic.   Eyes:      Pupils: Pupils are equal, round, and reactive to light.   Cardiovascular:      Rate and Rhythm: Normal rate and regular rhythm.      Pulses: Normal pulses.      Heart sounds: Normal heart sounds.   Pulmonary:      Effort: Pulmonary effort is normal.      Breath sounds: Normal breath sounds.      Comments: Lungs -  Good and equal air entry in both lung fields. No wheezing, rales or rhonchi noted.  Musculoskeletal:      Comments: Neck- Back-Patient moving on exam table slowly because of pain.   No vertebral point tenderness. Tense paraspinal muscle spasm and tenderness noted right cervical, right upper thoracic region/Trapizeus muscle noted.  Limited flexion and extension of the back. Pain with movement. Normal tendon reflexes.     Skin:     Capillary Refill: Capillary refill takes less than 2 seconds.   Neurological:      General: No focal deficit present.      Mental Status: She is alert.      Comments: Speech fluent.  Gait -cautious. Patient walking slowly because of pain.  No facial asymmetry noted.  Cranial nerve exam- Grossly intact  Motor and sensation grossly intact in all four extremities.     Psychiatric:         Mood and Affect: Mood normal.         Behavior: Behavior normal.         ASSESSMENT/PLAN     Kimberly was seen today for neck pain.    Diagnoses and all orders for this visit:    Strain of neck muscle, initial encounter  -     cyclobenzaprine (FLEXERIL) 10 MG tablet; Take 1 tablet by mouth 3 times daily as needed for Muscle spasms. Sedation precaution advised.  Do not drive while on medication  -     naproxen (NAPROSYN) 500 MG tablet; Take 1 tablet by mouth in the morning and 1 tablet in the evening. Take with meals. Do all this for 15 days.    Tension headache  -     cyclobenzaprine (FLEXERIL) 10 MG tablet; Take 1 tablet by mouth 3 times daily as needed for Muscle spasms. Sedation precaution advised.  Do not  drive while on medication  -     naproxen (NAPROSYN) 500 MG tablet; Take 1 tablet by mouth in the morning and 1 tablet in the evening. Take with meals. Do all this for 15 days.    .  -Take Flexeril 10 mg p.o. t.i.d. p.r.n. sedation precaution advised.  - Take Naproxen - 500 mg PO  with food BID as needed for pain.  - Apply heat to the area.  - Maintain proper back posture- Avoid bending/ twisting.  - Avoid sitting or standing in the same position for too long.  -Stretching exercises recommended.  -Advised stress management, advised relaxation techniques.  - Go to hospital if you develop numbness/tingling or weakness of your leg/arm, fever, urine or bowel incontinence; otherwise follow with primary doctor  if symptoms worsen or no improvement.  - Written instructions given.  - Patient understands and agrees with the plan.

## 2025-01-28 NOTE — TELEPHONE ENCOUNTER
----- Message from PRITESH Morrissey CNP sent at 5/9/2022 10:00 AM EDT -----  Her GFR is exactly the same at 72. Her microscopic urine test and protein/creatinine ratio are within normal limits. I recommend repeating the lab work in 3 months and discussing referral that time dependent on results. Negative Screen

## 2025-04-22 ENCOUNTER — APPOINTMENT (OUTPATIENT)
Dept: CT IMAGING | Age: 60
End: 2025-04-22
Payer: COMMERCIAL

## 2025-04-22 ENCOUNTER — APPOINTMENT (OUTPATIENT)
Dept: GENERAL RADIOLOGY | Age: 60
End: 2025-04-22
Payer: COMMERCIAL

## 2025-04-22 ENCOUNTER — HOSPITAL ENCOUNTER (EMERGENCY)
Age: 60
Discharge: HOME | End: 2025-04-22
Attending: EMERGENCY MEDICINE
Payer: COMMERCIAL

## 2025-04-22 VITALS
SYSTOLIC BLOOD PRESSURE: 152 MMHG | HEART RATE: 90 BPM | DIASTOLIC BLOOD PRESSURE: 83 MMHG | TEMPERATURE: 98.4 F | OXYGEN SATURATION: 96 % | RESPIRATION RATE: 14 BRPM

## 2025-04-22 DIAGNOSIS — R42 LIGHTHEADEDNESS: Primary | ICD-10-CM

## 2025-04-22 DIAGNOSIS — R55 NEAR SYNCOPE: ICD-10-CM

## 2025-04-22 LAB
ALBUMIN SERPL BCG-MCNC: 4.4 G/DL (ref 3.4–4.9)
ALP SERPL-CCNC: 112 U/L (ref 38–126)
ALT SERPL W/O P-5'-P-CCNC: 33 U/L (ref 10–35)
ANION GAP SERPL CALC-SCNC: 13 MEQ/L (ref 8–16)
APTT PPP: 28.9 SECONDS (ref 22–38)
AST SERPL-CCNC: 26 U/L (ref 10–35)
BASOPHILS ABSOLUTE: 0.1 THOU/MM3 (ref 0–0.1)
BASOPHILS NFR BLD AUTO: 0.7 %
BILIRUB SERPL-MCNC: < 0.2 MG/DL (ref 0.3–1.2)
BUN SERPL-MCNC: 16 MG/DL (ref 8–23)
CALCIUM SERPL-MCNC: 9.5 MG/DL (ref 8.6–10)
CHLORIDE SERPL-SCNC: 102 MEQ/L (ref 98–111)
CHP ED QC CHECK: YES
CO2 SERPL-SCNC: 25 MEQ/L (ref 22–29)
CREAT SERPL-MCNC: 0.9 MG/DL (ref 0.5–0.9)
DEPRECATED RDW RBC AUTO: 45.5 FL (ref 35–45)
EKG ATRIAL RATE: 88 BPM
EKG P AXIS: 70 DEGREES
EKG P-R INTERVAL: 148 MS
EKG Q-T INTERVAL: 368 MS
EKG QRS DURATION: 90 MS
EKG QTC CALCULATION (BAZETT): 445 MS
EKG R AXIS: 37 DEGREES
EKG T AXIS: 47 DEGREES
EKG VENTRICULAR RATE: 88 BPM
EOSINOPHIL NFR BLD AUTO: 1.8 %
EOSINOPHILS ABSOLUTE: 0.1 THOU/MM3 (ref 0–0.4)
ERYTHROCYTE [DISTWIDTH] IN BLOOD BY AUTOMATED COUNT: 13.3 % (ref 11.5–14.5)
GFR SERPL CREATININE-BSD FRML MDRD: 74 ML/MIN/1.73M2
GLUCOSE BLD-MCNC: 126 MG/DL
GLUCOSE SERPL-MCNC: 112 MG/DL (ref 74–109)
HCT VFR BLD AUTO: 43.9 % (ref 37–47)
HGB BLD-MCNC: 14.7 GM/DL (ref 12–16)
IMM GRANULOCYTES # BLD AUTO: 0.02 THOU/MM3 (ref 0–0.07)
IMM GRANULOCYTES NFR BLD AUTO: 0.3 %
INR PPP: 0.84 (ref 0.85–1.13)
LYMPHOCYTES ABSOLUTE: 2.5 THOU/MM3 (ref 1–4.8)
LYMPHOCYTES NFR BLD AUTO: 32.5 %
MCH RBC QN AUTO: 31.1 PG (ref 26–33)
MCHC RBC AUTO-ENTMCNC: 33.5 GM/DL (ref 32.2–35.5)
MCV RBC AUTO: 93 FL (ref 81–99)
MONOCYTES ABSOLUTE: 0.7 THOU/MM3 (ref 0.4–1.3)
MONOCYTES NFR BLD AUTO: 9.3 %
NEUTROPHILS ABSOLUTE: 4.2 THOU/MM3 (ref 1.8–7.7)
NEUTROPHILS NFR BLD AUTO: 55.4 %
NRBC BLD AUTO-RTO: 0 /100 WBC
OSMOLALITY SERPL CALC.SUM OF ELEC: 281.3 MOSMOL/KG (ref 275–300)
PLATELET # BLD AUTO: 317 THOU/MM3 (ref 130–400)
PMV BLD AUTO: 9.1 FL (ref 9.4–12.4)
POC CREATININE WHOLE BLOOD: 0.8 MG/DL (ref 0.5–1.2)
POTASSIUM SERPL-SCNC: 3.9 MEQ/L (ref 3.5–5.2)
PROT SERPL-MCNC: 7.4 G/DL (ref 6.4–8.3)
RBC # BLD AUTO: 4.72 MILL/MM3 (ref 4.2–5.4)
SODIUM SERPL-SCNC: 140 MEQ/L (ref 135–145)
TROPONIN, HIGH SENSITIVITY: < 6 NG/L (ref 0–12)
WBC # BLD AUTO: 7.6 THOU/MM3 (ref 4.8–10.8)

## 2025-04-22 PROCEDURE — 99291 CRITICAL CARE FIRST HOUR: CPT

## 2025-04-22 PROCEDURE — 82565 ASSAY OF CREATININE: CPT

## 2025-04-22 PROCEDURE — 80053 COMPREHEN METABOLIC PANEL: CPT

## 2025-04-22 PROCEDURE — 70450 CT HEAD/BRAIN W/O DYE: CPT

## 2025-04-22 PROCEDURE — 93010 ELECTROCARDIOGRAM REPORT: CPT | Performed by: INTERNAL MEDICINE

## 2025-04-22 PROCEDURE — 71045 X-RAY EXAM CHEST 1 VIEW: CPT

## 2025-04-22 PROCEDURE — 93005 ELECTROCARDIOGRAM TRACING: CPT | Performed by: EMERGENCY MEDICINE

## 2025-04-22 PROCEDURE — 85610 PROTHROMBIN TIME: CPT

## 2025-04-22 PROCEDURE — 85025 COMPLETE CBC W/AUTO DIFF WBC: CPT

## 2025-04-22 PROCEDURE — 85730 THROMBOPLASTIN TIME PARTIAL: CPT

## 2025-04-22 PROCEDURE — 99285 EMERGENCY DEPT VISIT HI MDM: CPT

## 2025-04-22 PROCEDURE — 84484 ASSAY OF TROPONIN QUANT: CPT

## 2025-04-22 PROCEDURE — 36415 COLL VENOUS BLD VENIPUNCTURE: CPT

## 2025-04-22 RX ORDER — 0.9 % SODIUM CHLORIDE 0.9 %
500 INTRAVENOUS SOLUTION INTRAVENOUS ONCE
Status: DISCONTINUED | OUTPATIENT
Start: 2025-04-22 | End: 2025-04-22 | Stop reason: HOSPADM

## 2025-04-22 RX ORDER — IOPAMIDOL 755 MG/ML
100 INJECTION, SOLUTION INTRAVASCULAR
Status: DISCONTINUED | OUTPATIENT
Start: 2025-04-22 | End: 2025-04-22 | Stop reason: HOSPADM

## 2025-04-22 RX ORDER — SODIUM CHLORIDE 9 MG/ML
INJECTION, SOLUTION INTRAVENOUS CONTINUOUS
Status: DISCONTINUED | OUTPATIENT
Start: 2025-04-22 | End: 2025-04-22 | Stop reason: HOSPADM

## 2025-04-22 ASSESSMENT — VISUAL ACUITY: VA_NORMAL: 1

## 2025-04-22 NOTE — ED NOTES
Patient in CT and tolerating scan well. Patient respirations are regular and unlabored. Patient appears to be in no current distress. Patient VSS.

## 2025-04-22 NOTE — ED NOTES
This RN to the bedside for rounding. Patient talking medical student at the bedside. Patient VSS. Respirations are easy and unlabored. Patient appears to be in no current distress at this time. Call light within reach.

## 2025-04-22 NOTE — ED PROVIDER NOTES
[facility] EMERGENCY DEPARTMENT  EMERGENCY DEPARTMENT ENCOUNTER          Pt Name: Precious Abarca  MRN: [id number]  Birthdate 1965  Date of evaluation: 4/22/2025  Physician: Niko Kamara MD  Supervising Attending Physician: Michele Mcclure MD       CHIEF COMPLAINT       Chief Complaint   Patient presents with    Dizziness         HISTORY OF PRESENT ILLNESS    HPI  Precious Abarca is a 59 y.o. female who presents to the emergency department  from [facility] as code stroke.  Upon initial evaluation, patient was found to have NIHSS of 0, code stroke had been called since patient had sudden onset of funny feeling, brain fog, lightheadedness and generalized weakness.  Patient was be fast negative, EMS code stroke was continued as per called from the field.    Denies fever, chills, headache, lightheadedness, dizziness, vision changes, tinnitus, cough, congestion, sore throat, neck pain/stiffness, chest pain, shortness of breath, abdominal pain, nausea, vomiting, constipation, diarrhea, dysuria, blood in the urine or stool, numbness/tingling/weakness in extremities.    The patient has no other acute complaints at this time.      PAST MEDICAL AND SURGICAL HISTORY     Past Medical History:   Diagnosis Date    Hypertension      Past Surgical History:   Procedure Laterality Date    ADENOIDECTOMY      APPENDECTOMY      BLADDER SUSPENSION      FOOT SURGERY      KNEE ARTHROSCOPY      MYRINGOTOMY AND TYMPANOSTOMY TUBE PLACEMENT      OVARIAN CYST SURGERY      \"in high school\"    TONSILLECTOMY      TUBAL LIGATION           MEDICATIONS     Current Facility-Administered Medications:     sodium chloride 0.9 % bolus 500 mL, 500 mL, IntraVENous, Once, Michele Mcclure MD    0.9 % sodium chloride infusion, , IntraVENous, Continuous, Michele Mcclure MD    iopamidol (ISOVUE-370) 76 % injection 100 mL, 100 mL, IntraVENous, ONCE PRN, Michele Mcclure MD    Current Outpatient Medications:     nicotine (NICOTROL) 10 MG inhaler, Inhale 1

## 2025-04-22 NOTE — DISCHARGE INSTRUCTIONS
Please follow-up with your PCP regarding your ongoing symptoms of dizziness and lightheadedness.  Please take care of your fluid intake.  Patient did Emergency Department if you have weakness, numbness in the extremity, loss of consciousness or any other concerns.

## 2025-04-22 NOTE — ED NOTES
Patient back in the ED at this time, patient emotional talking with co-worker. Patient is very apologetic due to her situation.

## 2025-04-22 NOTE — ED NOTES
Patient to the ED via [facility]. Patient reports sitting at her desk on the computer and did not feel like herself. Patient states she felt lightheaded after looking down and back up. Patient expresses when she stood up, she felt off and had staff at her workplace check her BP, patient reports her BP being in the 150s, 160s and 170s. Patient expresses lightheaded like she is going down a roller coaster only when she shuts her eyes.

## 2025-04-22 NOTE — ED NOTES
Patient agreeable to CXR at this time. Patient is very emotional and states she is scared of the contrast as well as going into the machine because she is claustrophobic.

## 2025-04-22 NOTE — CONSULTS
Neurology Stroke Alert Note    Date:4/22/2025       Room:[address] [place] [address]  Patient Name:Precious Abarca     YOB: 1965     Age:59 y.o.  Chief Complaint   Patient presents with    Dizziness        Requesting Physician: Michele Mcclure MD     Reason for Consult:  Evaluate for stroke alert    Subjective       HPI: Precious Abarca is a 59 y.o. female with a history of HTN, tobacco use, who presented to [facility] ED from work for evaluation of \"brain fog\" and intermittent dizziness. Stroke alert activated en route at 1215 with an ETA of 5 minutes. LKW reportedly 30 minutes prior to stroke alert activation. Patient arrived to CT around 1230 with an initial NIH of 0. Initial /98. POC glucose 126. Patient states she was sitting at her desk at work and just felt odd. She also reported some dizziness after moving her head up and down. Her SBP was reportedly running from 150-170s at the time of symptom onset. Non-contrasted CTH negative for acute intracranial abnormalities. Patient not a candidate for thrombolytics due to NIH of 0. Patient refusing CTAs due to increased anxiety and claustrophobia. Premedication offered by ED provider, however, patient continued to refuse. Low suspicion for stroke at this time. Patient not a candidate for endovascular intervention due to NIH of 0 and lack of CTA evidence of LVO. MRI brain WO recommended - ED provider to discuss with patient, as she continues to refuse neuro imaging at this time.      Last known well:  30 minutes prior to stroke alert activation   Time of stroke alert:  1215 with an ETA of 5 minutes  Time of neurology arrival:  1220  Vascular risk factors:  HTN, tobacco use  Initial glucose: 126  Old deficits from prior stroke:  NA  INR in ED if anticoagulated:  not applicable.  Initial blood pressure:  148/98  Initial NIHSS: 0  Pre-morbid Modified [place] Scale:  0  Time of initial imaging read:  1244  Thrombolytic administered:  not indicated/contraindicated.  Thrombectomy

## 2025-04-22 NOTE — PROCEDURES
PROCEDURE NOTE  Date: 4/22/2025   Name: Precious Workmanjusten  YOB: 1965    Procedures EKG completed. Code stroke given to resident

## 2025-04-22 NOTE — ED PROVIDER NOTES
PATIENT NAME: Precious Abarca  MRN: [id number]  : 1965  SAGASTUME: 2025    I performed a history and physical examination of the patient and discussed management with the Resident. I reviewed the Resident's note and agree with the documented findings and plan of care. Any areas of disagreement are noted on the chart. I was personally present for the key portions of any procedures and have documented in the chart those procedures where I was not present during the key portions. I have reviewed the emergency nurses triage note and agree with the chief complaint, past medical history, past surgical history, allergies, medications, social and family history as documented unless otherwise noted below.    MEDICAL DEDISION MAKING (MDM)     Precious Abarca is a 59 y.o. female presents with foggy sensation and dizziness.     Sudden onset.  Symptoms lasted for approximately 20 to 30 minutes.  No slurred speech, no blurry vision, no paresthesia from extremities, and extremity weakness.    Physical exam: AVSS. Nontoxic appearing. Heart: RRR, S1 and S2. Lungs CTAB. Soft abdomen w/o tenderness. Neurologically intact. No skin rashes. NIHSS 0.     EKG: NSR, VR 88 bpm, KY interval 148 ms, QRS duration 90 ms, , no acute ischemic changes, borderline normal ECG.    A stroke alert was called by squad.  After primary survey and neuroexam, stroke alert was canceled.  Head CT does not reveal acute abnormalities.  Lab results are reassuring.  EKG has no acute abnormalities.    I recommend a brain MRI DWI patient refused.  I also recommend a CTA head and neck to look LVO and she also refused.    Her lab results are reassuring.  No clear etiology of patient's symptoms.  I recommend outpatient PCP follow-up to consider bilateral carotid artery duplex and echocardiogram.  Admission is offered but the patient declined.  Discharged in stable conditions      Vitals:    25 1232 25 1242 25 1339 25 1440   BP:

## 2025-04-23 ENCOUNTER — CARE COORDINATION (OUTPATIENT)
Dept: OTHER | Facility: CLINIC | Age: 60
End: 2025-04-23

## 2025-04-23 NOTE — CARE COORDINATION
Ambulatory Care Coordination Note     4/23/2025 12:40 PM     Patient outreach attempt by this ACM today to offer care management services. ACM was unable to reach the patient by telephone today;   left voice message requesting a return phone call to this ACM.     ACM: ELIECER DELGADILLO RN     Care Summary Note: N/A    PCP/Specialist follow up:       Follow Up:   Plan for next ACM outreach in approximately 1 week to complete:  - outreach attempt to offer care management services.

## 2025-04-30 ENCOUNTER — CARE COORDINATION (OUTPATIENT)
Dept: OTHER | Facility: CLINIC | Age: 60
End: 2025-04-30

## 2025-04-30 NOTE — CARE COORDINATION
Ambulatory Care Coordination Note     2025 2:14 PM     Patient Current Location:  [place]     This patient was received as a referral from [email or web address] .    ACM contacted the patient by telephone. Verified name and  with patient as identifiers. Provided introduction to self, and explanation of the ACM role.   Patient declined care management services at this time.          ACM: ELIECER DELGADILLO RN      Care Summary Note: ACM able to contact patient. Patient declined the need for care management services at this time. Patient stated that she has everything she needs. ACM provided contact information to patient. AC will also send a [employer] message with contact information if any needs arise in the future.        Follow Up:   No further Ambulatory Care Management follow-up scheduled at this time.  Patient  has Ambulatory Care Manager's contact information for any further questions, concerns or needs.

## 2025-05-07 ENCOUNTER — TELEPHONE (OUTPATIENT)
Dept: FAMILY MEDICINE CLINIC | Age: 60
End: 2025-05-07

## 2025-05-07 NOTE — TELEPHONE ENCOUNTER
Patient called in asking \"if there any way possible to be seen earlier or if there is a cancellation that she would get called?\"

## 2025-05-07 NOTE — TELEPHONE ENCOUNTER
I currently, d/t pending time out of the office and other providers being out as well, am unable to work new pts any sooner than my next available new pt slot.   My apologies.

## 2025-06-15 SDOH — HEALTH STABILITY: PHYSICAL HEALTH: ON AVERAGE, HOW MANY MINUTES DO YOU ENGAGE IN EXERCISE AT THIS LEVEL?: 0 MIN

## 2025-06-15 SDOH — HEALTH STABILITY: PHYSICAL HEALTH: ON AVERAGE, HOW MANY DAYS PER WEEK DO YOU ENGAGE IN MODERATE TO STRENUOUS EXERCISE (LIKE A BRISK WALK)?: 0 DAYS

## 2025-06-15 NOTE — PROGRESS NOTES
Chief Complaint   Patient presents with    Establish Care    Obesity    Leg Swelling for years    Alopecia    Hypertension    Hyperlipidemia     History obtained from the patient.    SUBJECTIVE:  Precious Abarca is a 59 y.o. female that presents today for establishing care with new physician, etc. New patient, [facility].    -Obesity:  Struggling to lose wt  On good diet  Exercises as able.   On Semaglutide in past, but did not tolerate and was not helpful  Would like to try adipex      -LE swelling:  Ongoing issue for 5 + years  Some days better than others.   Mild in the AM, worse as days goes on  Worse in the heat  Was given Lasix one time, that seemed to work the best  Prior PCP did not want her to use Lasix, so increased her HCTZ she was on for HTN to 37.5mg.   That hasn't been particular helpful  Compression helps some  Wts stable  No CP, SOB, orthopnea or PND      -Hair loss:  Losing hair over the last 4-5 months  Noting more in the drain  Hair is thinner.   Wants to monitor for now      -HTN:    HPI:    Taking meds as prescribed ?: yes  Tolerating well ?: yes  Side Effects ?: denies  BP at home ?: <140/90  Working on TLCS ?: yes  Chest Pain/SOB/Palpitations? denies    BP Readings from Last 3 Encounters:   06/18/25 130/74   04/22/25 (!) 152/83   05/17/22 133/83       -HLD:  Diet controlled  Labs UTD      -GERD:    HPI:    Taking meds as prescribed ?: yes  Tolerating well ?: yes  Side Effects ?: denies  Dysphagia ?: denies  Odynophagia ?: denies  Melena ?: denies  Working on TLCS ?: denies      -Vit D def:  On supplementation  Labs UTD      -Breast cancer screening:  Discussed mammogram today, declines  Will consider at a later date.       Age/Gender Health Maintenance    Lipid -   The 10-year ASCVD risk score (Saad ALVARADO, et al., 2019) is: 3.4%  Lab Results   Component Value Date    CHOL 211 (H) 11/22/2024    CHOL 215 (H) 07/07/2020     Lab Results   Component Value Date    TRIG 122

## 2025-06-18 ENCOUNTER — OFFICE VISIT (OUTPATIENT)
Dept: FAMILY MEDICINE CLINIC | Age: 60
End: 2025-06-18
Payer: COMMERCIAL

## 2025-06-18 VITALS
RESPIRATION RATE: 16 BRPM | HEIGHT: 63 IN | BODY MASS INDEX: 38.55 KG/M2 | TEMPERATURE: 97.8 F | SYSTOLIC BLOOD PRESSURE: 130 MMHG | OXYGEN SATURATION: 96 % | HEART RATE: 67 BPM | WEIGHT: 217.6 LBS | DIASTOLIC BLOOD PRESSURE: 74 MMHG

## 2025-06-18 DIAGNOSIS — E78.5 DYSLIPIDEMIA: ICD-10-CM

## 2025-06-18 DIAGNOSIS — Z12.31 ENCOUNTER FOR SCREENING MAMMOGRAM FOR MALIGNANT NEOPLASM OF BREAST: ICD-10-CM

## 2025-06-18 DIAGNOSIS — I10 HYPERTENSION, ESSENTIAL: ICD-10-CM

## 2025-06-18 DIAGNOSIS — L65.9 ALOPECIA: ICD-10-CM

## 2025-06-18 DIAGNOSIS — E66.9 OBESITY (BMI 30-39.9): Primary | ICD-10-CM

## 2025-06-18 DIAGNOSIS — E55.9 VITAMIN D DEFICIENCY: ICD-10-CM

## 2025-06-18 DIAGNOSIS — I87.2 CHRONIC VENOUS INSUFFICIENCY OF LOWER EXTREMITY: ICD-10-CM

## 2025-06-18 PROCEDURE — 3075F SYST BP GE 130 - 139MM HG: CPT | Performed by: FAMILY MEDICINE

## 2025-06-18 PROCEDURE — 99205 OFFICE O/P NEW HI 60 MIN: CPT | Performed by: FAMILY MEDICINE

## 2025-06-18 PROCEDURE — 3078F DIAST BP <80 MM HG: CPT | Performed by: FAMILY MEDICINE

## 2025-06-18 RX ORDER — LISINOPRIL 40 MG/1
40 TABLET ORAL DAILY
Qty: 30 TABLET | Refills: 3 | Status: SHIPPED | OUTPATIENT
Start: 2025-06-18

## 2025-06-18 RX ORDER — HYDROCHLOROTHIAZIDE 25 MG/1
TABLET ORAL
COMMUNITY
Start: 2025-04-30 | End: 2025-06-18 | Stop reason: DRUGHIGH

## 2025-06-18 RX ORDER — PHENTERMINE HYDROCHLORIDE 37.5 MG/1
37.5 TABLET ORAL
Qty: 30 TABLET | Refills: 0 | Status: SHIPPED | OUTPATIENT
Start: 2025-06-18 | End: 2025-07-18

## 2025-06-18 RX ORDER — IPRATROPIUM BROMIDE AND ALBUTEROL SULFATE 2.5; .5 MG/3ML; MG/3ML
SOLUTION RESPIRATORY (INHALATION) EVERY 6 HOURS PRN
COMMUNITY
Start: 2024-11-15 | End: 2025-06-18

## 2025-06-18 RX ORDER — FUROSEMIDE 20 MG/1
20 TABLET ORAL DAILY
Qty: 30 TABLET | Refills: 3 | Status: SHIPPED | OUTPATIENT
Start: 2025-06-18

## 2025-06-18 RX ORDER — OMEPRAZOLE 40 MG/1
40 CAPSULE, DELAYED RELEASE ORAL DAILY
COMMUNITY
Start: 2025-05-01

## 2025-06-18 RX ORDER — LISINOPRIL AND HYDROCHLOROTHIAZIDE 12.5; 2 MG/1; MG/1
1 TABLET ORAL DAILY
COMMUNITY
Start: 2024-11-15 | End: 2025-06-18 | Stop reason: DRUGHIGH

## 2025-06-18 SDOH — ECONOMIC STABILITY: FOOD INSECURITY: WITHIN THE PAST 12 MONTHS, THE FOOD YOU BOUGHT JUST DIDN'T LAST AND YOU DIDN'T HAVE MONEY TO GET MORE.: NEVER TRUE

## 2025-06-18 SDOH — ECONOMIC STABILITY: FOOD INSECURITY: WITHIN THE PAST 12 MONTHS, YOU WORRIED THAT YOUR FOOD WOULD RUN OUT BEFORE YOU GOT MONEY TO BUY MORE.: NEVER TRUE

## 2025-06-18 ASSESSMENT — PATIENT HEALTH QUESTIONNAIRE - PHQ9
1. LITTLE INTEREST OR PLEASURE IN DOING THINGS: NOT AT ALL
SUM OF ALL RESPONSES TO PHQ QUESTIONS 1-9: 1
SUM OF ALL RESPONSES TO PHQ QUESTIONS 1-9: 1
2. FEELING DOWN, DEPRESSED OR HOPELESS: SEVERAL DAYS
SUM OF ALL RESPONSES TO PHQ QUESTIONS 1-9: 1
SUM OF ALL RESPONSES TO PHQ QUESTIONS 1-9: 1

## 2025-06-18 NOTE — PATIENT INSTRUCTIONS
LAB INSTRUCTIONS:    Please complete labs IN 1 week(s).    Please fast for 8 hours prior to lab collection.    The clinic will call you within 1 week of collection. If you have not heard from us within that amount of time, please call us at [phone].

## 2025-07-16 ENCOUNTER — LAB (OUTPATIENT)
Dept: FAMILY MEDICINE CLINIC | Age: 60
End: 2025-07-16

## 2025-07-16 ENCOUNTER — TELEPHONE (OUTPATIENT)
Dept: FAMILY MEDICINE CLINIC | Age: 60
End: 2025-07-16

## 2025-07-16 VITALS — BODY MASS INDEX: 37.03 KG/M2 | WEIGHT: 209 LBS

## 2025-07-16 DIAGNOSIS — E66.9 OBESITY (BMI 30-39.9): Primary | ICD-10-CM

## 2025-07-16 RX ORDER — PHENTERMINE HYDROCHLORIDE 37.5 MG/1
37.5 TABLET ORAL
Qty: 30 TABLET | Refills: 0 | Status: SHIPPED | OUTPATIENT
Start: 2025-07-16 | End: 2025-07-16 | Stop reason: SDUPTHER

## 2025-07-16 RX ORDER — PHENTERMINE HYDROCHLORIDE 37.5 MG/1
37.5 TABLET ORAL
Qty: 30 TABLET | Refills: 0 | Status: SHIPPED | OUTPATIENT
Start: 2025-07-16 | End: 2025-08-15

## 2025-07-16 NOTE — TELEPHONE ENCOUNTER
Wt Readings from Last 3 Encounters:   07/16/25 94.8 kg (209 lb)   06/18/25 98.7 kg (217 lb 9.6 oz)   05/17/22 91.3 kg (201 lb 3.2 oz)     Future Appointments   Date Time Provider Department Center   7/16/2025  8:40 AM SCHEDULE, NURSE [facility]   8/13/2025  8:00 AM SCHEDULE, NURSE [facility]   9/18/2025  4:20 PM Tj Astudillo,  [facility]    [facility].

## 2025-07-16 NOTE — TELEPHONE ENCOUNTER
Patient asked for this medication to go to [facility] in [facility].

## 2025-07-16 NOTE — PROGRESS NOTES
Wt Readings from Last 3 Encounters:   07/16/25 94.8 kg (209 lb)   06/18/25 98.7 kg (217 lb 9.6 oz)   05/17/22 91.3 kg (201 lb 3.2 oz)      Future Appointments   Date Time Provider Department Center   7/16/2025  8:40 AM SCHEDULE, NURSE [facility]   8/13/2025  8:00 AM SCHEDULE, NURSE [facility]   9/18/2025  4:20 PM Tj Astudillo,  [facility]      Use [facility] in [facility].

## 2025-07-16 NOTE — TELEPHONE ENCOUNTER
Ok  Please make sure we're getting that information at the visit.  Thanks!    Cx at [facility] pharmacy    ASSESSMENT & PLAN   Diagnosis Orders   1. Obesity (BMI 30-39.9)  phentermine (ADIPEX-P) 37.5 MG tablet    DISCONTINUED: phentermine (ADIPEX-P) 37.5 MG tablet          OAARS reviewed and appropriate.     Controlled Substances Monitoring: Periodic Controlled Substance Monitoring: Possible medication side effects, risk of tolerance/dependence & alternative treatments discussed., No signs of potential drug abuse or diversion identified. (Tj Astudillo DO)      Future Appointments   Date Time Provider Department Center   8/13/2025  8:00 AM SCHEDULE, NURSE [facility] DEP   9/18/2025  4:20 PM Tj Astudillo DO [facility] DEP

## 2025-07-16 NOTE — TELEPHONE ENCOUNTER
Pt informed of Adipex sent in . Pt voiced understanding with no further questions at this time.

## 2025-07-16 NOTE — TELEPHONE ENCOUNTER
ASSESSMENT & PLAN   Diagnosis Orders   1. Obesity (BMI 30-39.9)  phentermine (ADIPEX-P) 37.5 MG tablet          OAARS reviewed and appropriate.     Controlled Substances Monitoring: Periodic Controlled Substance Monitoring: Possible medication side effects, risk of tolerance/dependence & alternative treatments discussed., No signs of potential drug abuse or diversion identified. (Tj Astudillo, )  Future Appointments   Date Time Provider Department Center   8/13/2025  8:00 AM SCHEDULE, NURSE [facility] DEP   9/18/2025  4:20 PM Tj Astudillo,  [facility] DEP